# Patient Record
Sex: MALE | Race: WHITE | NOT HISPANIC OR LATINO | Employment: PART TIME | URBAN - NONMETROPOLITAN AREA
[De-identification: names, ages, dates, MRNs, and addresses within clinical notes are randomized per-mention and may not be internally consistent; named-entity substitution may affect disease eponyms.]

---

## 2021-07-16 ENCOUNTER — HOSPITAL ENCOUNTER (OUTPATIENT)
Dept: ULTRASOUND IMAGING | Facility: HOSPITAL | Age: 66
Discharge: HOME | End: 2021-07-16
Payer: COMMERCIAL

## 2021-07-16 DIAGNOSIS — Z13.6 SCREENING FOR ABDOMINAL AORTIC ANEURYSM: ICD-10-CM

## 2021-07-16 PROCEDURE — 76706 US ABDL AORTA SCREEN AAA: CPT

## 2022-09-01 ENCOUNTER — APPOINTMENT (OUTPATIENT)
Dept: RADIOLOGY | Facility: HOSPITAL | Age: 67
End: 2022-09-01
Payer: COMMERCIAL

## 2022-09-01 ENCOUNTER — HOSPITAL ENCOUNTER (EMERGENCY)
Facility: HOSPITAL | Age: 67
Discharge: HOME | End: 2022-09-01
Attending: EMERGENCY MEDICINE | Admitting: EMERGENCY MEDICINE
Payer: COMMERCIAL

## 2022-09-01 VITALS
BODY MASS INDEX: 24.72 KG/M2 | DIASTOLIC BLOOD PRESSURE: 97 MMHG | OXYGEN SATURATION: 100 % | TEMPERATURE: 97.7 F | WEIGHT: 144.8 LBS | HEART RATE: 76 BPM | SYSTOLIC BLOOD PRESSURE: 144 MMHG | RESPIRATION RATE: 21 BRPM | HEIGHT: 64 IN

## 2022-09-01 DIAGNOSIS — M79.641 RIGHT HAND PAIN: Primary | ICD-10-CM

## 2022-09-01 PROCEDURE — 73130 X-RAY EXAM OF HAND: CPT

## 2022-09-01 PROCEDURE — 99284 EMERGENCY DEPT VISIT MOD MDM: CPT | Performed by: EMERGENCY MEDICINE

## 2022-09-01 PROCEDURE — 99283 EMERGENCY DEPT VISIT LOW MDM: CPT

## 2022-09-01 RX ORDER — DOCUSATE SODIUM 100 MG/1
CAPSULE, LIQUID FILLED ORAL
COMMUNITY

## 2022-09-01 RX ORDER — SIMVASTATIN 40 MG
1 TABLET ORAL DAILY
COMMUNITY
Start: 2022-06-05

## 2022-09-01 RX ORDER — PANTOPRAZOLE SODIUM 40 MG/1
TABLET, DELAYED RELEASE ORAL
COMMUNITY

## 2022-09-01 RX ORDER — HYDROCHLOROTHIAZIDE 12.5 MG/1
1 CAPSULE, GELATIN COATED ORAL DAILY
COMMUNITY
Start: 2022-06-05

## 2022-09-01 RX ORDER — FLUTICASONE PROPIONATE 50 MCG
2 SPRAY, SUSPENSION (ML) NASAL DAILY
COMMUNITY
Start: 2022-07-27

## 2022-09-01 RX ORDER — FAMOTIDINE 20 MG/1
20 TABLET, FILM COATED ORAL 2 TIMES DAILY
COMMUNITY
Start: 2022-08-02

## 2022-09-01 RX ORDER — ZIPRASIDONE HYDROCHLORIDE 80 MG/1
1 CAPSULE ORAL
COMMUNITY
Start: 2022-05-09

## 2022-09-01 RX ORDER — CHLORAL HYDRATE 500 MG
CAPSULE ORAL
COMMUNITY

## 2022-09-01 RX ORDER — ASPIRIN 81 MG/1
TABLET ORAL
COMMUNITY

## 2022-09-01 NOTE — ED PROVIDER NOTES
History  Chief Complaint   Patient presents with    Hand Pain     Pt states he came from work, uses weed chele daily and states he has been having right hand for about 2 years and worsening and hand feels tight  Does take ibuprofen with minimal relief  49-year-old male complains of right hand 3rd MCP pain with extension worse with using weed whacker, is his trigger finger  No direct injury  No other complaints  No numbness weakness some swelling  History provided by:  Patient  Hand Pain  Location:  Right 3rd MCP  Quality:  Ache, sharp  Severity:  Severe  Onset quality: Mostly with use  Timing:  Intermittent  Progression:  Partially resolved  Chronicity:  Chronic  Context:  Occurs with use of weed whacker  Relieved by:  Ibuprofen  Worsened by:  Extension, use  Associated symptoms: no chest pain, no fever and no shortness of breath        Prior to Admission Medications   Prescriptions Last Dose Informant Patient Reported? Taking?    Garlic 10 MG CAPS   Yes No   Omega-3 Fatty Acids (fish oil) 1,000 mg   Yes No   Sig: Take by mouth   aspirin (ECOTRIN LOW STRENGTH) 81 mg EC tablet   Yes No   Sig: Take by mouth   docusate sodium (Colace) 100 mg capsule   Yes No   Sig: Take by mouth   famotidine (PEPCID) 20 mg tablet   Yes Yes   Sig: Take 20 mg by mouth 2 (two) times a day   fluticasone (FLONASE) 50 mcg/act nasal spray   Yes Yes   Si sprays by Each Nare route daily   hydrochlorothiazide (MICROZIDE) 12 5 mg capsule   Yes Yes   Sig: Take 1 capsule by mouth daily   pantoprazole (PROTONIX) 40 mg tablet   Yes No   Sig: Take by mouth   simvastatin (ZOCOR) 40 mg tablet   Yes Yes   Sig: Take 1 tablet by mouth daily   ziprasidone (GEODON) 80 mg capsule   Yes Yes   Sig: Take 1 capsule by mouth daily at bedtime      Facility-Administered Medications: None       Past Medical History:   Diagnosis Date    Anxiety     Hyperlipemia     Hypertension     Psychiatric disorder        Past Surgical History:   Procedure Laterality Date    CHOLECYSTECTOMY      HERNIA REPAIR         History reviewed  No pertinent family history  I have reviewed and agree with the history as documented  E-Cigarette/Vaping    E-Cigarette Use Never User      E-Cigarette/Vaping Substances     Social History     Tobacco Use    Smoking status: Never Smoker    Smokeless tobacco: Never Used   Vaping Use    Vaping Use: Never used   Substance Use Topics    Alcohol use: Not Currently    Drug use: Not Currently       Review of Systems   Constitutional: Negative for fever  Respiratory: Negative for shortness of breath  Cardiovascular: Negative for chest pain  All other systems reviewed and are negative  Physical Exam  Physical Exam    Vital Signs  ED Triage Vitals [09/01/22 1418]   Temperature Pulse Respirations Blood Pressure SpO2   97 7 °F (36 5 °C) 76 21 144/97 100 %      Temp Source Heart Rate Source Patient Position - Orthostatic VS BP Location FiO2 (%)   Temporal Monitor Sitting Left arm --      Pain Score       8           Vitals:    09/01/22 1418   BP: 144/97   Pulse: 76   Patient Position - Orthostatic VS: Sitting         Visual Acuity      ED Medications  Medications - No data to display    Diagnostic Studies  Results Reviewed     None                 XR hand 3+ views RIGHT   ED Interpretation by Amada Stubbs DO (09/01 1505)   No obvious fracture or destructive changes       Final Result by Reanna Oates MD (09/02 0231)      No acute osseous abnormality              Workstation performed: [id number]                    Procedures  Procedures         ED Course  ED Course as of 09/02/22 1553   Fri Sep 02, 2022   1552 Procedure note:  Right 3rd finger put in Alumafoam splint, taped and placed and extended over 3rd MCP, well tolerated, provided extra tape     instructed on use care, voices good understanding and agreeable with close outpatient follow-up, will return immediately if worse or any new symptoms SBIRT 20yo+    Flowsheet Row Most Recent Value   SBIRT (23 yo +)    In order to provide better care to our patients, we are screening all of our patients for alcohol and drug use  Would it be okay to ask you these screening questions? Yes Filed at: 09/01/2022 1425   Initial Alcohol Screen: US AUDIT-C     1  How often do you have a drink containing alcohol? 0 Filed at: 09/01/2022 1425   2  How many drinks containing alcohol do you have on a typical day you are drinking? 0 Filed at: 09/01/2022 1425   3a  Male UNDER 65: How often do you have five or more drinks on one occasion? 0 Filed at: 09/01/2022 1425   3b  FEMALE Any Age, or MALE 65+: How often do you have 4 or more drinks on one occassion? 0 Filed at: 09/01/2022 1425   Audit-C Score 0 Filed at: 09/01/2022 1425   SERGIO: How many times in the past year have you    Used an illegal drug or used a prescription medication for non-medical reasons? Never Filed at: 09/01/2022 1425                    MDM    Disposition  Final diagnoses:   Right hand pain - Third metacarpal phalangeal overuse injury     Time reflects when diagnosis was documented in both MDM as applicable and the Disposition within this note     Time User Action Codes Description Comment    9/1/2022  3:05 PM Charis More Add [B47 166] Right hand pain     9/1/2022  3:06 PM Charis More Modify [P43 885] Right hand pain Third metacarpal phalangeal overuse injury      ED Disposition     ED Disposition   Discharge    Condition   Stable    Date/Time   u Sep 1, 2022  3:05 PM    Comment   Apolinar Ramirez discharge to home/self care                 Follow-up Information     Follow up With Specialties Details Why Irina Edmonds MD Family Medicine Schedule an appointment as soon as possible for a visit in 1 week  Kevin Mai [id number]  [phone]            Discharge Medication List as of 9/1/2022  3:06 PM      CONTINUE these medications which have NOT CHANGED    Details   famotidine (PEPCID) 20 mg tablet Take 20 mg by mouth 2 (two) times a day, Starting Tue 8/2/2022, Historical Med      fluticasone (FLONASE) 50 mcg/act nasal spray 2 sprays by Each Nare route daily, Starting Wed 7/27/2022, Historical Med      hydrochlorothiazide (MICROZIDE) 12 5 mg capsule Take 1 capsule by mouth daily, Starting Sun 6/5/2022, Historical Med      simvastatin (ZOCOR) 40 mg tablet Take 1 tablet by mouth daily, Starting Sun 6/5/2022, Historical Med      ziprasidone (GEODON) 80 mg capsule Take 1 capsule by mouth daily at bedtime, Starting Mon 5/9/2022, Historical Med      aspirin (ECOTRIN LOW STRENGTH) 81 mg EC tablet Take by mouth, Historical Med      docusate sodium (Colace) 100 mg capsule Take by mouth, Historical Med      Garlic 10 MG CAPS Historical Med      Omega-3 Fatty Acids (fish oil) 1,000 mg Take by mouth, Historical Med      pantoprazole (PROTONIX) 40 mg tablet Take by mouth, Historical Med             No discharge procedures on file      PDMP Review     None          ED Provider  Electronically Signed by           Marnie Shelton DO  09/02/22 8981 mouth, Historical Med      docusate sodium (Colace) 100 mg capsule Take by mouth, Historical Med      Garlic 10 MG CAPS Historical Med      Omega-3 Fatty Acids (fish oil) 1,000 mg Take by mouth, Historical Med      pantoprazole (PROTONIX) 40 mg tablet Take by mouth, Historical Med             No discharge procedures on file      PDMP Review     None          ED Provider  Electronically Signed by           Elisabeth Gibbs DO  09/02/22 [id number] [place], DO  09/07/22 7285

## 2022-09-01 NOTE — DISCHARGE INSTRUCTIONS
Return immediately if worse or any new symptoms  Tylenol 1000 mg every 6 hours as needed  and/or  Advil 400 mg every 6 hours as needed  May take both together  Ice 20 minutes hourly as needed  Use splint for comfort

## 2022-10-30 ENCOUNTER — APPOINTMENT (EMERGENCY)
Dept: RADIOLOGY | Facility: HOSPITAL | Age: 67
End: 2022-10-30

## 2022-10-30 ENCOUNTER — HOSPITAL ENCOUNTER (EMERGENCY)
Facility: HOSPITAL | Age: 67
Discharge: HOME | End: 2022-10-30
Attending: EMERGENCY MEDICINE

## 2022-10-30 VITALS
BODY MASS INDEX: 25.07 KG/M2 | HEIGHT: 64 IN | RESPIRATION RATE: 18 BRPM | DIASTOLIC BLOOD PRESSURE: 68 MMHG | HEART RATE: 80 BPM | OXYGEN SATURATION: 97 % | SYSTOLIC BLOOD PRESSURE: 123 MMHG | TEMPERATURE: 97.7 F | WEIGHT: 146.83 LBS

## 2022-10-30 DIAGNOSIS — R05.9 COUGH, UNSPECIFIED TYPE: Primary | ICD-10-CM

## 2022-10-30 RX ORDER — ALBUTEROL SULFATE 90 UG/1
2 AEROSOL, METERED RESPIRATORY (INHALATION) ONCE
Status: COMPLETED | OUTPATIENT
Start: 2022-10-30 | End: 2022-10-30

## 2022-10-30 RX ORDER — PREDNISONE 10 MG/1
50 TABLET ORAL DAILY
Qty: 25 TABLET | Refills: 0 | Status: SHIPPED | OUTPATIENT
Start: 2022-10-30 | End: 2022-11-04

## 2022-10-30 RX ORDER — ALBUTEROL SULFATE 90 UG/1
AEROSOL, METERED RESPIRATORY (INHALATION)
Qty: 18 G | Refills: 0 | Status: SHIPPED | OUTPATIENT
Start: 2022-10-30

## 2022-10-30 RX ADMIN — ALBUTEROL SULFATE 2 PUFF: 90 AEROSOL, METERED RESPIRATORY (INHALATION) at 09:50

## 2022-10-30 NOTE — ED PROVIDER NOTES
History  Chief Complaint   Patient presents with   • Cough     Pt c/o worsening cough over past couple weeks  Taking mucinex w/o relief  Denies sob/fevers/n/v/d     14-year-old male complains of chronic cough for about 4 years that is worse over the past few days, no chest pain or discomfort unless coughing  No sputum, no hemoptysis  No fever chills  No other complaints      History provided by:  Patient  Cough  Cough characteristics:  Dry  Severity:  Moderate  Onset quality:  Gradual  Timing:  Constant  Progression:  Worsening  Chronicity:  Chronic  Smoker: no    Relieved by:  Nothing  Worsened by:  Nothing  Ineffective treatments:  None tried  Associated symptoms: no chest pain, no fever and no shortness of breath        Prior to Admission Medications   Prescriptions Last Dose Informant Patient Reported? Taking? Garlic 10 MG CAPS   Yes No   Omega-3 Fatty Acids (fish oil) 1,000 mg   Yes No   Sig: Take by mouth   aspirin (ECOTRIN LOW STRENGTH) 81 mg EC tablet   Yes No   Sig: Take by mouth   docusate sodium (Colace) 100 mg capsule   Yes No   Sig: Take by mouth   famotidine (PEPCID) 20 mg tablet   Yes No   Sig: Take 20 mg by mouth 2 (two) times a day   fluticasone (FLONASE) 50 mcg/act nasal spray   Yes No   Si sprays by Each Nare route daily   hydrochlorothiazide (MICROZIDE) 12 5 mg capsule   Yes No   Sig: Take 1 capsule by mouth daily   pantoprazole (PROTONIX) 40 mg tablet   Yes No   Sig: Take by mouth   simvastatin (ZOCOR) 40 mg tablet   Yes No   Sig: Take 1 tablet by mouth daily   ziprasidone (GEODON) 80 mg capsule   Yes No   Sig: Take 1 capsule by mouth daily at bedtime      Facility-Administered Medications: None       Past Medical History:   Diagnosis Date   • Anxiety    • Hyperlipemia    • Hypertension    • Psychiatric disorder        Past Surgical History:   Procedure Laterality Date   • CHOLECYSTECTOMY     • HERNIA REPAIR         History reviewed  No pertinent family history    I have reviewed and agree with the history as documented  E-Cigarette/Vaping   • E-Cigarette Use Never User      E-Cigarette/Vaping Substances     Social History     Tobacco Use   • Smoking status: Never Smoker   • Smokeless tobacco: Never Used   Vaping Use   • Vaping Use: Never used   Substance Use Topics   • Alcohol use: Not Currently   • Drug use: Not Currently       Review of Systems   Constitutional: Negative for fever  Respiratory: Positive for cough  Negative for shortness of breath  Cardiovascular: Negative for chest pain  All other systems reviewed and are negative  Physical Exam  Physical Exam  Vitals and nursing note reviewed  Constitutional:       Comments: Pleasant, comfortable-appearing   HENT:      Head: Normocephalic and atraumatic  Mouth/Throat:      Mouth: Mucous membranes are moist       Pharynx: Oropharynx is clear  Comments: Edentulous  Eyes:      Conjunctiva/sclera: Conjunctivae normal       Pupils: Pupils are equal, round, and reactive to light  Cardiovascular:      Rate and Rhythm: Normal rate and regular rhythm  Heart sounds: Normal heart sounds  Pulmonary:      Effort: Pulmonary effort is normal       Breath sounds: Normal breath sounds  Abdominal:      General: Bowel sounds are normal  There is no distension  Palpations: Abdomen is soft  Tenderness: There is no abdominal tenderness  Musculoskeletal:         General: No deformity  Cervical back: Neck supple  Skin:     General: Skin is warm and dry  Neurological:      General: No focal deficit present  Mental Status: He is alert and oriented to person, place, and time  Cranial Nerves: No cranial nerve deficit  Coordination: Coordination normal    Psychiatric:         Behavior: Behavior normal          Thought Content:  Thought content normal          Judgment: Judgment normal          Vital Signs  ED Triage Vitals [10/30/22 0927]   Temperature Pulse Respirations Blood Pressure SpO2   97 7 °F (36 5 °C) 87 19 146/76 98 %      Temp Source Heart Rate Source Patient Position - Orthostatic VS BP Location FiO2 (%)   Temporal Monitor Lying Right arm --      Pain Score       No Pain           Vitals:    10/30/22 0927 10/30/22 0930 10/30/22 1000 10/30/22 1030   BP: 146/76 128/62 126/91 123/68   Pulse: 87 90 90 80   Patient Position - Orthostatic VS: Lying            Visual Acuity      ED Medications  Medications   albuterol (PROVENTIL HFA,VENTOLIN HFA) inhaler 2 puff (2 puffs Inhalation Given 10/30/22 0950)       Diagnostic Studies  Results Reviewed     None                 XR chest 2 views   ED Interpretation by Mike Molina DO (10/30 1045)   No obvious infiltrate                 Procedures  Procedures         ED Course  ED Course as of 10/30/22 1053   Sun Oct 30, 2022   1049 Notes used albuterol inhaler, possibly improved  We discussed chest x-ray, care going forward and follow-up, he is agreeable and will return immediately if worse or new symptoms                               SBIRT 20yo+    Flowsheet Row Most Recent Value   SBIRT (23 yo +)    In order to provide better care to our patients, we are screening all of our patients for alcohol and drug use  Would it be okay to ask you these screening questions? No Filed at: 10/30/2022 3498                    MDM    Disposition  Final diagnoses:   Cough, unspecified type     Time reflects when diagnosis was documented in both MDM as applicable and the Disposition within this note     Time User Action Codes Description Comment    10/30/2022 10:46 AM Angelita Gomes Add [R05 9] Cough, unspecified type       ED Disposition     ED Disposition   Discharge    Condition   Stable    Date/Time   Sun Oct 30, 2022 10:45 AM    Comment   Lily Massing discharge to home/self care                 Follow-up Information     Follow up With Specialties Details Why Kleber Phillips MD Family Medicine Schedule an appointment as soon as possible for a visit in 1 week  [address]  [phone]            Patient's Medications   Discharge Prescriptions    ALBUTEROL (PROVENTIL HFA,VENTOLIN HFA) 90 MCG/ACT INHALER    2-3 inhalations every 3-4 hours for 3 days and then as needed for cough, wheezing       Start Date: 10/30/2022End Date: --       Order Dose: --       Quantity: 18 g    Refills: 0    PREDNISONE 10 MG TABLET    Take 5 tablets (50 mg total) by mouth daily for 5 days       Start Date: 10/30/2022End Date: 11/4/2022       Order Dose: 50 mg       Quantity: 25 tablet    Refills: 0       No discharge procedures on file      PDMP Review     None          ED Provider  Electronically Signed by           Mike Molina DO  10/31/22 3665

## 2022-10-30 NOTE — DISCHARGE INSTRUCTIONS
Exacerbation of chronic cough  Return immediately if worse or any new symptoms  Albuterol 2-3 inhalations every 3-4 hours for the next 3 days and then as needed, use AeroChamber/spacer

## 2023-02-04 ENCOUNTER — HOSPITAL ENCOUNTER (EMERGENCY)
Facility: HOSPITAL | Age: 68
Discharge: HOME | End: 2023-02-04
Attending: EMERGENCY MEDICINE

## 2023-02-04 ENCOUNTER — APPOINTMENT (EMERGENCY)
Dept: CT IMAGING | Facility: HOSPITAL | Age: 68
End: 2023-02-04

## 2023-02-04 VITALS
SYSTOLIC BLOOD PRESSURE: 145 MMHG | WEIGHT: 150.79 LBS | OXYGEN SATURATION: 98 % | BODY MASS INDEX: 25.74 KG/M2 | RESPIRATION RATE: 20 BRPM | TEMPERATURE: 97.4 F | DIASTOLIC BLOOD PRESSURE: 76 MMHG | HEIGHT: 64 IN | HEART RATE: 86 BPM

## 2023-02-04 DIAGNOSIS — R10.9 ABDOMINAL PAIN: Primary | ICD-10-CM

## 2023-02-04 DIAGNOSIS — K21.9 GERD (GASTROESOPHAGEAL REFLUX DISEASE): ICD-10-CM

## 2023-02-04 DIAGNOSIS — K59.00 CONSTIPATION: ICD-10-CM

## 2023-02-04 LAB
ALBUMIN SERPL BCP-MCNC: 4.5 G/DL (ref 3.5–5)
ALP SERPL-CCNC: 54 U/L (ref 34–104)
ALT SERPL W P-5'-P-CCNC: 21 U/L (ref 7–52)
ANION GAP SERPL CALCULATED.3IONS-SCNC: 9 MMOL/L (ref 4–13)
AST SERPL W P-5'-P-CCNC: 26 U/L (ref 13–39)
BASOPHILS # BLD AUTO: 0.05 THOUSANDS/ÂΜL (ref 0–0.1)
BASOPHILS NFR BLD AUTO: 1 % (ref 0–1)
BILIRUB SERPL-MCNC: 0.28 MG/DL (ref 0.2–1)
BUN SERPL-MCNC: 20 MG/DL (ref 5–25)
CALCIUM SERPL-MCNC: 9 MG/DL (ref 8.4–10.2)
CHLORIDE SERPL-SCNC: 100 MMOL/L (ref 96–108)
CO2 SERPL-SCNC: 29 MMOL/L (ref 21–32)
CREAT SERPL-MCNC: 0.96 MG/DL (ref 0.6–1.3)
EOSINOPHIL # BLD AUTO: 0.14 THOUSAND/ÂΜL (ref 0–0.61)
EOSINOPHIL NFR BLD AUTO: 2 % (ref 0–6)
ERYTHROCYTE [DISTWIDTH] IN BLOOD BY AUTOMATED COUNT: 12.1 % (ref 11.6–15.1)
GFR SERPL CREATININE-BSD FRML MDRD: 81 ML/MIN/1.73SQ M
GLUCOSE SERPL-MCNC: 93 MG/DL (ref 65–140)
HCT VFR BLD AUTO: 44 % (ref 36.5–49.3)
HGB BLD-MCNC: 15 G/DL (ref 12–17)
IMM GRANULOCYTES # BLD AUTO: 0.01 THOUSAND/UL (ref 0–0.2)
IMM GRANULOCYTES NFR BLD AUTO: 0 % (ref 0–2)
LACTATE SERPL-SCNC: 2.1 MMOL/L (ref 0.5–2)
LIPASE SERPL-CCNC: <6 U/L (ref 11–82)
LYMPHOCYTES # BLD AUTO: 1.41 THOUSANDS/ÂΜL (ref 0.6–4.47)
LYMPHOCYTES NFR BLD AUTO: 23 % (ref 14–44)
MCH RBC QN AUTO: 29.5 PG (ref 26.8–34.3)
MCHC RBC AUTO-ENTMCNC: 34.1 G/DL (ref 31.4–37.4)
MCV RBC AUTO: 86 FL (ref 82–98)
MONOCYTES # BLD AUTO: 0.56 THOUSAND/ÂΜL (ref 0.17–1.22)
MONOCYTES NFR BLD AUTO: 9 % (ref 4–12)
NEUTROPHILS # BLD AUTO: 3.93 THOUSANDS/ÂΜL (ref 1.85–7.62)
NEUTS SEG NFR BLD AUTO: 65 % (ref 43–75)
NRBC BLD AUTO-RTO: 0 /100 WBCS
PLATELET # BLD AUTO: 240 THOUSANDS/UL (ref 149–390)
PMV BLD AUTO: 8.9 FL (ref 8.9–12.7)
POTASSIUM SERPL-SCNC: 3.2 MMOL/L (ref 3.5–5.3)
PROT SERPL-MCNC: 7.5 G/DL (ref 6.4–8.4)
RBC # BLD AUTO: 5.09 MILLION/UL (ref 3.88–5.62)
SODIUM SERPL-SCNC: 138 MMOL/L (ref 135–147)
WBC # BLD AUTO: 6.1 THOUSAND/UL (ref 4.31–10.16)

## 2023-02-04 RX ORDER — TOPIRAMATE 50 MG/1
50 TABLET, FILM COATED ORAL 2 TIMES DAILY
COMMUNITY

## 2023-02-04 RX ORDER — POTASSIUM CHLORIDE 20 MEQ/1
20 TABLET, EXTENDED RELEASE ORAL ONCE
Status: COMPLETED | OUTPATIENT
Start: 2023-02-04 | End: 2023-02-04

## 2023-02-04 RX ORDER — ONDANSETRON 2 MG/ML
4 INJECTION INTRAMUSCULAR; INTRAVENOUS ONCE
Status: COMPLETED | OUTPATIENT
Start: 2023-02-04 | End: 2023-02-04

## 2023-02-04 RX ORDER — BENZONATATE 100 MG/1
100 CAPSULE ORAL 3 TIMES DAILY PRN
COMMUNITY

## 2023-02-04 RX ADMIN — IOHEXOL 100 ML: 350 INJECTION, SOLUTION INTRAVENOUS at 17:20

## 2023-02-04 RX ADMIN — ONDANSETRON 4 MG: 2 INJECTION INTRAMUSCULAR; INTRAVENOUS at 16:20

## 2023-02-04 RX ADMIN — POTASSIUM CHLORIDE 20 MEQ: 20 TABLET, EXTENDED RELEASE ORAL at 17:20

## 2023-02-04 NOTE — ED PROVIDER NOTES
History  Chief Complaint   Patient presents with   • Nausea     Patient states "the asbestos in the [place] is killing me"  Reports abdominal pain, nausea, constipation, reflux  27-year-old male presenting to the emergency department for evaluation of abdominal pain, nausea  Patient states he has chronic constipation and GERD  Currently taking Protonix and eating a high-fiber diet  Patient is concerned for the asbestos in his apartment  He denies any chest pain, palpitations, shortness of breath  He denies any vomiting  Denies diarrhea  Denies any urinary symptoms  History provided by:  Patient  Abdominal Pain  Pain location:  Generalized  Pain quality comment:  Unable to specify  Pain radiates to:  Does not radiate  Pain severity:  Mild  Onset quality:  Gradual  Progression:  Unchanged  Chronicity:  New  Relieved by:  None tried  Worsened by:  Nothing  Ineffective treatments:  None tried  Associated symptoms: constipation and nausea    Associated symptoms: no anorexia, no belching, no chest pain, no chills, no cough, no diarrhea, no dysuria, no fatigue, no fever, no flatus, no hematemesis, no hematochezia, no hematuria, no melena, no shortness of breath, no sore throat and no vomiting        Prior to Admission Medications   Prescriptions Last Dose Informant Patient Reported? Taking?    Garlic 10 MG CAPS 8851  Yes Yes   Omega-3 Fatty Acids (fish oil) 1,000 mg 2023  Yes Yes   Sig: Take by mouth   albuterol (PROVENTIL HFA,VENTOLIN HFA) 90 mcg/act inhaler   No Yes   Si-3 inhalations every 3-4 hours for 3 days and then as needed for cough, wheezing   aspirin (ECOTRIN LOW STRENGTH) 81 mg EC tablet 2023  Yes Yes   Sig: Take by mouth   benzonatate (TESSALON PERLES) 100 mg capsule   Yes Yes   Sig: Take 100 mg by mouth 3 (three) times a day as needed for cough   docusate sodium (COLACE) 100 mg capsule 2023  Yes Yes   Sig: Take by mouth   famotidine (PEPCID) 20 mg tablet 2023  Yes Yes Sig: Take 20 mg by mouth 2 (two) times a day   fluticasone (FLONASE) 50 mcg/act nasal spray 2023  Yes Yes   Si sprays by Each Nare route daily   hydrochlorothiazide (MICROZIDE) 12 5 mg capsule 2023  Yes Yes   Sig: Take 1 capsule by mouth daily   pantoprazole (PROTONIX) 40 mg tablet 2023  Yes Yes   Sig: Take by mouth   simvastatin (ZOCOR) 40 mg tablet 2023  Yes Yes   Sig: Take 1 tablet by mouth daily   topiramate (TOPAMAX) 50 MG tablet 2023  Yes Yes   Sig: Take 50 mg by mouth 2 (two) times a day   ziprasidone (GEODON) 80 mg capsule 2/3/2023  Yes Yes   Sig: Take 1 capsule by mouth daily at bedtime      Facility-Administered Medications: None       Past Medical History:   Diagnosis Date   • Anxiety    • Hyperlipemia    • Hypertension    • Psychiatric disorder        Past Surgical History:   Procedure Laterality Date   • CHOLECYSTECTOMY     • HERNIA REPAIR         No family history on file  I have reviewed and agree with the history as documented  E-Cigarette/Vaping   • E-Cigarette Use Never User      E-Cigarette/Vaping Substances     Social History     Tobacco Use   • Smoking status: Never   • Smokeless tobacco: Never   Vaping Use   • Vaping Use: Never used   Substance Use Topics   • Alcohol use: Not Currently   • Drug use: Not Currently       Review of Systems   Constitutional: Negative for appetite change, chills, diaphoresis, fatigue and fever  HENT: Negative for sore throat  Respiratory: Negative  Negative for cough and shortness of breath  Cardiovascular: Negative  Negative for chest pain  Gastrointestinal: Positive for abdominal pain, constipation and nausea  Negative for anorexia, diarrhea, flatus, hematemesis, hematochezia, melena and vomiting  Genitourinary: Negative  Negative for dysuria and hematuria  Musculoskeletal: Negative  Neurological: Negative  All other systems reviewed and are negative        Physical Exam  Physical Exam  Vitals and nursing note reviewed  Constitutional:       General: He is not in acute distress  Appearance: Normal appearance  He is not ill-appearing, toxic-appearing or diaphoretic  HENT:      Head: Normocephalic  Nose: Nose normal    Eyes:      Conjunctiva/sclera: Conjunctivae normal    Cardiovascular:      Rate and Rhythm: Normal rate and regular rhythm  Pulmonary:      Effort: Pulmonary effort is normal  No respiratory distress  Breath sounds: Normal breath sounds  No stridor  No wheezing, rhonchi or rales  Chest:      Chest wall: No tenderness  Abdominal:      General: Abdomen is flat  Bowel sounds are normal  There is no distension  Palpations: Abdomen is soft  Tenderness: There is abdominal tenderness (Generalized)  There is no right CVA tenderness, left CVA tenderness or guarding  Musculoskeletal:         General: Normal range of motion  Cervical back: Normal range of motion  Skin:     General: Skin is warm and dry  Capillary Refill: Capillary refill takes less than 2 seconds  Findings: No bruising, erythema or rash  Neurological:      General: No focal deficit present  Mental Status: He is alert and oriented to person, place, and time           Vital Signs  ED Triage Vitals [02/04/23 1608]   Temperature Pulse Respirations Blood Pressure SpO2   (!) 97 4 °F (36 3 °C) 95 20 145/76 97 %      Temp Source Heart Rate Source Patient Position - Orthostatic VS BP Location FiO2 (%)   Temporal Monitor Lying Right arm --      Pain Score       No Pain           Vitals:    02/04/23 1730 02/04/23 1745 02/04/23 1800 02/04/23 1815   BP:       Pulse: 89 89 89 86   Patient Position - Orthostatic VS:             Visual Acuity      ED Medications  Medications   ondansetron (ZOFRAN) injection 4 mg (4 mg Intravenous Given 2/4/23 1620)   potassium chloride (K-DUR,KLOR-CON) CR tablet 20 mEq (20 mEq Oral Given 2/4/23 1720)   iohexol (OMNIPAQUE) 350 MG/ML injection (SINGLE-DOSE) 100 mL (100 mL Intravenous Given 2/4/23 1720)       Diagnostic Studies  Results Reviewed     Procedure Component Value Units Date/Time    Lactic acid [[id number]]  (Abnormal) Collected: 02/04/23 1615    Lab Status: Final result Specimen: Blood from Arm, Left Updated: 02/04/23 1653     LACTIC ACID 2 1 mmol/L     Narrative:      Result may be elevated if tourniquet was used during collection      Lactic acid 2 Hours [[id number]]     Lab Status: No result Specimen: Blood     Comprehensive metabolic panel [[id number]]  (Abnormal) Collected: 02/04/23 1615    Lab Status: Final result Specimen: Blood from Arm, Left Updated: 02/04/23 1637     Sodium 138 mmol/L      Potassium 3 2 mmol/L      Chloride 100 mmol/L      CO2 29 mmol/L      ANION GAP 9 mmol/L      BUN 20 mg/dL      Creatinine 0 96 mg/dL      Glucose 93 mg/dL      Calcium 9 0 mg/dL      AST 26 U/L      ALT 21 U/L      Alkaline Phosphatase 54 U/L      Total Protein 7 5 g/dL      Albumin 4 5 g/dL      Total Bilirubin 0 28 mg/dL      eGFR 81 ml/min/1 73sq m     Narrative:      [place] guidelines for Chronic Kidney Disease (CKD):   •  Stage 1 with normal or high GFR (GFR > 90 mL/min/1 73 square meters)  •  Stage 2 Mild CKD (GFR = 60-89 mL/min/1 73 square meters)  •  Stage 3A Moderate CKD (GFR = 45-59 mL/min/1 73 square meters)  •  Stage 3B Moderate CKD (GFR = 30-44 mL/min/1 73 square meters)  •  Stage 4 Severe CKD (GFR = 15-29 mL/min/1 73 square meters)  •  Stage 5 End Stage CKD (GFR <15 mL/min/1 73 square meters)  Note: GFR calculation is accurate only with a steady state creatinine    Lipase [[id number]]  (Abnormal) Collected: 02/04/23 1615    Lab Status: Final result Specimen: Blood from Arm, Left Updated: 02/04/23 1637     Lipase <6 u/L     CBC and differential [[id number]] Collected: 02/04/23 1615    Lab Status: Final result Specimen: Blood from Arm, Left Updated: 02/04/23 1620     WBC 6 10 Thousand/uL      RBC 5 09 Million/uL      Hemoglobin 15 0 g/dL Hematocrit 44 0 %      MCV 86 fL      MCH 29 5 pg      MCHC 34 1 g/dL      RDW 12 1 %      MPV 8 9 fL      Platelets 980 Thousands/uL      nRBC 0 /100 WBCs      Neutrophils Relative 65 %      Immat GRANS % 0 %      Lymphocytes Relative 23 %      Monocytes Relative 9 %      Eosinophils Relative 2 %      Basophils Relative 1 %      Neutrophils Absolute 3 93 Thousands/µL      Immature Grans Absolute 0 01 Thousand/uL      Lymphocytes Absolute 1 41 Thousands/µL      Monocytes Absolute 0 56 Thousand/µL      Eosinophils Absolute 0 14 Thousand/µL      Basophils Absolute 0 05 Thousands/µL                  CT abdomen pelvis with contrast   Final Result by Clari Yoon MD (02/04 1814)      1  No acute abnormality in the abdomen or pelvis  2   Moderate amount of stool throughout the colon, suggesting constipation  3   Small amount of fluid in the distal esophagus, suggesting gastroesophageal reflux  Workstation performed: [id number]                    Procedures  Procedures         ED Course  ED Course as of 02/04/23 1842   Sat Feb 04, 2023   1821 CT: 1  No acute abnormality in the abdomen or pelvis      2  Moderate amount of stool throughout the colon, suggesting constipation      3   Small amount of fluid in the distal esophagus, suggesting gastroesophageal reflux  1825 Laboratory findings relatively unremarkable  Very mild  Actiq acid  No leukocytosis  Potassium was mildly low and repleted orally  Medical Decision Making  71-year-old male presents to the emergency department for evaluation of generalized abdominal pain and nausea  Vitals and medical record reviewed  Differential diagnosis included but not limited to gastroenteritis, constipation, pancreatitis, cholecystitis  Patient with no leukocytosis  Lactic acid minimally elevated  Patient is tolerating p o  intake after Zofran  Had some generalized abdominal discomfort on exam   Lipase negative    No evidence of pancreatitis on CT scan  Normal LFTs  No evidence of cholecystitis on CT scan  No evidence of enteritis on CAT scan  There was noted constipation as well as findings consistent with GERD  I discussed these results and findings with the patient  Recommend he continue taking his current prescription medications and follow-up with his PCP  Additionally discussed a high-fiber diet including use of over-the-counter stool softeners and he verbalized understanding  Patient was clinically and hemodynamically stable for discharge    Abdominal pain: acute illness or injury  Constipation: acute illness or injury  GERD (gastroesophageal reflux disease): acute illness or injury  Amount and/or Complexity of Data Reviewed  Labs: ordered  Radiology: ordered  Risk  Prescription drug management  Disposition  Final diagnoses:   Abdominal pain   Constipation   GERD (gastroesophageal reflux disease)     Time reflects when diagnosis was documented in both MDM as applicable and the Disposition within this note     Time User Action Codes Description Comment    2/4/2023  6:23 PM Ciara Quiver Add [R10 9] Abdominal pain     2/4/2023  6:23 PM Ciara Quiver Add [K59 00] Constipation     2/4/2023  6:23 PM Ciara Quiver Add [K21 9] GERD (gastroesophageal reflux disease)       ED Disposition     ED Disposition   Discharge    Condition   Stable    Date/Time   Sat Feb 4, 2023  6:22 PM    Comment   Reji Willis discharge to home/self care                 Follow-up Information     Follow up With Specialties Details Why Contact Info    Ellen Mullins MD Family Medicine In 3 days  [address] [place] [address]  [phone]            Discharge Medication List as of 2/4/2023  6:24 PM      CONTINUE these medications which have NOT CHANGED    Details   albuterol (PROVENTIL HFA,VENTOLIN HFA) 90 mcg/act inhaler 2-3 inhalations every 3-4 hours for 3 days and then as needed for cough, wheezing, Normal      aspirin (ECOTRIN LOW STRENGTH) 81 mg EC tablet Take by mouth, Historical Med      benzonatate (TESSALON PERLES) 100 mg capsule Take 100 mg by mouth 3 (three) times a day as needed for cough, Historical Med      docusate sodium (COLACE) 100 mg capsule Take by mouth, Historical Med      famotidine (PEPCID) 20 mg tablet Take 20 mg by mouth 2 (two) times a day, Starting Tue 8/2/2022, Historical Med      fluticasone (FLONASE) 50 mcg/act nasal spray 2 sprays by Each Nare route daily, Starting Wed 7/27/2022, Historical Med      Garlic 10 MG CAPS Historical Med      hydrochlorothiazide (MICROZIDE) 12 5 mg capsule Take 1 capsule by mouth daily, Starting Sun 6/5/2022, Historical Med      Omega-3 Fatty Acids (fish oil) 1,000 mg Take by mouth, Historical Med      pantoprazole (PROTONIX) 40 mg tablet Take by mouth, Historical Med      simvastatin (ZOCOR) 40 mg tablet Take 1 tablet by mouth daily, Starting Sun 6/5/2022, Historical Med      topiramate (TOPAMAX) 50 MG tablet Take 50 mg by mouth 2 (two) times a day, Historical Med      ziprasidone (GEODON) 80 mg capsule Take 1 capsule by mouth daily at bedtime, Starting Mon 5/9/2022, Historical Med             No discharge procedures on file      PDMP Review     None          ED Provider  Electronically Signed by           Domingo Clement PA-C  02/04/23 5609

## 2023-02-04 NOTE — DISCHARGE INSTRUCTIONS
CT abdomen pelvis with contrast   Final Result      1  No acute abnormality in the abdomen or pelvis  2   Moderate amount of stool throughout the colon, suggesting constipation  3   Small amount of fluid in the distal esophagus, suggesting gastroesophageal reflux  Workstation performed: [id number]           Please follow-up with your family doctor  Take your prescribed medications for your acid reflux  Continue with bowel regimen as we discussed with high-fiber foods  Make sure you are getting plenty of water    Return with new or worsening symptoms

## 2023-02-21 ENCOUNTER — APPOINTMENT (EMERGENCY)
Dept: RADIOLOGY | Facility: HOSPITAL | Age: 68
End: 2023-02-21

## 2023-02-21 ENCOUNTER — HOSPITAL ENCOUNTER (EMERGENCY)
Facility: HOSPITAL | Age: 68
Discharge: HOME | End: 2023-02-21
Attending: EMERGENCY MEDICINE

## 2023-02-21 VITALS
BODY MASS INDEX: 27.78 KG/M2 | OXYGEN SATURATION: 96 % | TEMPERATURE: 98.2 F | RESPIRATION RATE: 18 BRPM | HEART RATE: 88 BPM | DIASTOLIC BLOOD PRESSURE: 76 MMHG | WEIGHT: 161.82 LBS | SYSTOLIC BLOOD PRESSURE: 133 MMHG

## 2023-02-21 DIAGNOSIS — R07.9 CHEST PAIN, UNSPECIFIED TYPE: Primary | ICD-10-CM

## 2023-02-21 LAB
2HR DELTA HS TROPONIN: -1 NG/L
ALBUMIN SERPL BCP-MCNC: 4.2 G/DL (ref 3.5–5)
ALP SERPL-CCNC: 51 U/L (ref 34–104)
ALT SERPL W P-5'-P-CCNC: 24 U/L (ref 7–52)
ANION GAP SERPL CALCULATED.3IONS-SCNC: 6 MMOL/L (ref 4–13)
AST SERPL W P-5'-P-CCNC: 23 U/L (ref 13–39)
BASOPHILS # BLD AUTO: 0.06 THOUSANDS/ÂΜL (ref 0–0.1)
BASOPHILS NFR BLD AUTO: 1 % (ref 0–1)
BILIRUB SERPL-MCNC: 0.24 MG/DL (ref 0.2–1)
BUN SERPL-MCNC: 21 MG/DL (ref 5–25)
CALCIUM SERPL-MCNC: 8.7 MG/DL (ref 8.4–10.2)
CARDIAC TROPONIN I PNL SERPL HS: 2 NG/L
CARDIAC TROPONIN I PNL SERPL HS: 3 NG/L
CHLORIDE SERPL-SCNC: 100 MMOL/L (ref 96–108)
CO2 SERPL-SCNC: 32 MMOL/L (ref 21–32)
CREAT SERPL-MCNC: 0.94 MG/DL (ref 0.6–1.3)
EOSINOPHIL # BLD AUTO: 0.23 THOUSAND/ÂΜL (ref 0–0.61)
EOSINOPHIL NFR BLD AUTO: 4 % (ref 0–6)
ERYTHROCYTE [DISTWIDTH] IN BLOOD BY AUTOMATED COUNT: 12.5 % (ref 11.6–15.1)
GFR SERPL CREATININE-BSD FRML MDRD: 83 ML/MIN/1.73SQ M
GLUCOSE SERPL-MCNC: 110 MG/DL (ref 65–140)
HCT VFR BLD AUTO: 42.3 % (ref 36.5–49.3)
HGB BLD-MCNC: 13.7 G/DL (ref 12–17)
IMM GRANULOCYTES # BLD AUTO: 0.02 THOUSAND/UL (ref 0–0.2)
IMM GRANULOCYTES NFR BLD AUTO: 0 % (ref 0–2)
LYMPHOCYTES # BLD AUTO: 1.88 THOUSANDS/ÂΜL (ref 0.6–4.47)
LYMPHOCYTES NFR BLD AUTO: 31 % (ref 14–44)
MCH RBC QN AUTO: 28.7 PG (ref 26.8–34.3)
MCHC RBC AUTO-ENTMCNC: 32.4 G/DL (ref 31.4–37.4)
MCV RBC AUTO: 89 FL (ref 82–98)
MONOCYTES # BLD AUTO: 0.57 THOUSAND/ÂΜL (ref 0.17–1.22)
MONOCYTES NFR BLD AUTO: 9 % (ref 4–12)
NEUTROPHILS # BLD AUTO: 3.37 THOUSANDS/ÂΜL (ref 1.85–7.62)
NEUTS SEG NFR BLD AUTO: 55 % (ref 43–75)
NRBC BLD AUTO-RTO: 0 /100 WBCS
PLATELET # BLD AUTO: 212 THOUSANDS/UL (ref 149–390)
PMV BLD AUTO: 8.7 FL (ref 8.9–12.7)
POTASSIUM SERPL-SCNC: 3.3 MMOL/L (ref 3.5–5.3)
PROT SERPL-MCNC: 7.1 G/DL (ref 6.4–8.4)
RBC # BLD AUTO: 4.77 MILLION/UL (ref 3.88–5.62)
SODIUM SERPL-SCNC: 138 MMOL/L (ref 135–147)
WBC # BLD AUTO: 6.13 THOUSAND/UL (ref 4.31–10.16)

## 2023-02-21 NOTE — ED PROVIDER NOTES
History  Chief Complaint   Patient presents with   • Chest Pain     Patient c/o chest pain for about 20 minutes ago  History provided by:  Medical records and patient  Chest Pain  Pain location:  Substernal area  Pain quality: aching and dull    Pain quality comment:  "Feels like gas pocket"  Pain radiates to:  Does not radiate  Pain radiates to the back: no    Pain severity:  Moderate  Onset quality:  Sudden  Duration:  30 minutes  Timing:  Constant  Progression:  Unchanged  Chronicity:  Recurrent  Context comment:  Patient with a history of acid reflux, states he developed some central chest discomfort, nonradiating 30 minutes prior to arrival   Relieved by:  Nothing  Worsened by:  Nothing tried  Ineffective treatments:  None tried  Associated symptoms: no abdominal pain, no back pain, no cough, no dizziness, no dysphagia, no fatigue, no fever, no headache, no nausea, no palpitations, no shortness of breath, not vomiting and no weakness        Prior to Admission Medications   Prescriptions Last Dose Informant Patient Reported? Taking?    Garlic 10 MG CAPS   Yes No   Omega-3 Fatty Acids (fish oil) 1,000 mg   Yes No   Sig: Take by mouth   albuterol (PROVENTIL HFA,VENTOLIN HFA) 90 mcg/act inhaler   No No   Si-3 inhalations every 3-4 hours for 3 days and then as needed for cough, wheezing   aspirin (ECOTRIN LOW STRENGTH) 81 mg EC tablet   Yes No   Sig: Take by mouth   benzonatate (TESSALON PERLES) 100 mg capsule   Yes No   Sig: Take 100 mg by mouth 3 (three) times a day as needed for cough   docusate sodium (COLACE) 100 mg capsule   Yes No   Sig: Take by mouth   famotidine (PEPCID) 20 mg tablet   Yes No   Sig: Take 20 mg by mouth 2 (two) times a day   fluticasone (FLONASE) 50 mcg/act nasal spray   Yes No   Si sprays by Each Nare route daily   hydrochlorothiazide (MICROZIDE) 12 5 mg capsule   Yes No   Sig: Take 1 capsule by mouth daily   pantoprazole (PROTONIX) 40 mg tablet   Yes No   Sig: Take by mouth simvastatin (ZOCOR) 40 mg tablet   Yes No   Sig: Take 1 tablet by mouth daily   topiramate (TOPAMAX) 50 MG tablet   Yes No   Sig: Take 50 mg by mouth 2 (two) times a day   ziprasidone (GEODON) 80 mg capsule   Yes No   Sig: Take 1 capsule by mouth daily at bedtime      Facility-Administered Medications: None       Past Medical History:   Diagnosis Date   • Anxiety    • Hyperlipemia    • Hypertension    • Psychiatric disorder        Past Surgical History:   Procedure Laterality Date   • CHOLECYSTECTOMY     • HERNIA REPAIR         History reviewed  No pertinent family history  I have reviewed and agree with the history as documented  E-Cigarette/Vaping   • E-Cigarette Use Never User      E-Cigarette/Vaping Substances     Social History     Tobacco Use   • Smoking status: Never   • Smokeless tobacco: Never   Vaping Use   • Vaping Use: Never used   Substance Use Topics   • Alcohol use: Not Currently   • Drug use: Not Currently       Review of Systems   Constitutional: Negative for appetite change, chills, fatigue and fever  HENT: Negative for ear pain, rhinorrhea, sore throat and trouble swallowing  Eyes: Negative for pain, discharge and visual disturbance  Respiratory: Negative for cough, chest tightness and shortness of breath  Cardiovascular: Positive for chest pain  Negative for palpitations  Gastrointestinal: Negative for abdominal pain, nausea and vomiting  Endocrine: Negative for polydipsia, polyphagia and polyuria  Genitourinary: Negative for difficulty urinating, dysuria, hematuria and testicular pain  Musculoskeletal: Negative for arthralgias and back pain  Skin: Negative for color change and rash  Allergic/Immunologic: Negative for immunocompromised state  Neurological: Negative for dizziness, seizures, syncope, weakness and headaches  Hematological: Negative for adenopathy  Psychiatric/Behavioral: Negative for confusion and dysphoric mood     All other systems reviewed and are negative  Physical Exam  Physical Exam  Vitals and nursing note reviewed  Constitutional:       General: He is not in acute distress  Appearance: Normal appearance  He is not ill-appearing, toxic-appearing or diaphoretic  Comments: Watching TV   HENT:      Head: Normocephalic and atraumatic  Nose: Nose normal  No congestion or rhinorrhea  Mouth/Throat:      Mouth: Mucous membranes are moist       Pharynx: Oropharynx is clear  No oropharyngeal exudate or posterior oropharyngeal erythema  Eyes:      General:         Right eye: No discharge  Left eye: No discharge  Cardiovascular:      Rate and Rhythm: Normal rate and regular rhythm  Pulses: Normal pulses  Heart sounds: Normal heart sounds  No murmur heard  No gallop  Pulmonary:      Effort: Pulmonary effort is normal  No respiratory distress  Breath sounds: Normal breath sounds  No stridor  No wheezing, rhonchi or rales  Chest:      Chest wall: No tenderness  Abdominal:      General: Bowel sounds are normal  There is no distension  Palpations: Abdomen is soft  There is no mass  Tenderness: There is no abdominal tenderness  There is no right CVA tenderness, left CVA tenderness, guarding or rebound  Hernia: No hernia is present  Musculoskeletal:         General: Normal range of motion  Cervical back: Normal range of motion and neck supple  Skin:     General: Skin is warm and dry  Capillary Refill: Capillary refill takes less than 2 seconds  Neurological:      General: No focal deficit present  Mental Status: He is alert and oriented to person, place, and time  Cranial Nerves: No cranial nerve deficit  Sensory: No sensory deficit  Motor: No weakness        Coordination: Coordination normal       Gait: Gait normal       Deep Tendon Reflexes: Reflexes normal    Psychiatric:         Mood and Affect: Mood normal          Behavior: Behavior normal          Thought Content:  Thought content normal          Judgment: Judgment normal          Vital Signs  ED Triage Vitals [02/21/23 1738]   Temperature Pulse Respirations Blood Pressure SpO2   98 2 °F (36 8 °C) 98 18 155/80 98 %      Temp Source Heart Rate Source Patient Position - Orthostatic VS BP Location FiO2 (%)   Temporal Monitor Lying Left arm --      Pain Score       --           Vitals:    02/21/23 1738 02/21/23 1830   BP: 155/80 133/76   Pulse: 98 88   Patient Position - Orthostatic VS: Lying          Visual Acuity      ED Medications  Medications - No data to display    Diagnostic Studies  Results Reviewed     Procedure Component Value Units Date/Time    HS Troponin I 2hr [[id number]]  (Normal) Collected: 02/21/23 2009    Lab Status: Final result Specimen: Blood from Arm, Left Updated: 02/21/23 2035     hs TnI 2hr 2 ng/L      Delta 2hr hsTnI -1 ng/L     HS Troponin I 4hr [[id number]]     Lab Status: No result Specimen: Blood     HS Troponin 0hr (reflex protocol) [[id number]]  (Normal) Collected: 02/21/23 1759    Lab Status: Final result Specimen: Blood from Arm, Left Updated: 02/21/23 1829     hs TnI 0hr 3 ng/L     Comprehensive metabolic panel [[id number]]  (Abnormal) Collected: 02/21/23 1759    Lab Status: Final result Specimen: Blood from Arm, Left Updated: 02/21/23 1821     Sodium 138 mmol/L      Potassium 3 3 mmol/L      Chloride 100 mmol/L      CO2 32 mmol/L      ANION GAP 6 mmol/L      BUN 21 mg/dL      Creatinine 0 94 mg/dL      Glucose 110 mg/dL      Calcium 8 7 mg/dL      AST 23 U/L      ALT 24 U/L      Alkaline Phosphatase 51 U/L      Total Protein 7 1 g/dL      Albumin 4 2 g/dL      Total Bilirubin 0 24 mg/dL      eGFR 83 ml/min/1 73sq m     Narrative:      [facility] guidelines for Chronic Kidney Disease (CKD):   •  Stage 1 with normal or high GFR (GFR > 90 mL/min/1 73 square meters)  •  Stage 2 Mild CKD (GFR = 60-89 mL/min/1 73 square meters)  •  Stage 3A Moderate CKD (GFR = 45-59 mL/min/1 73 square meters)  •  Stage 3B Moderate CKD (GFR = 30-44 mL/min/1 73 square meters)  •  Stage 4 Severe CKD (GFR = 15-29 mL/min/1 73 square meters)  •  Stage 5 End Stage CKD (GFR <15 mL/min/1 73 square meters)  Note: GFR calculation is accurate only with a steady state creatinine    CBC and differential [[id number]]  (Abnormal) Collected: 02/21/23 1759    Lab Status: Final result Specimen: Blood from Arm, Left Updated: 02/21/23 1806     WBC 6 13 Thousand/uL      RBC 4 77 Million/uL      Hemoglobin 13 7 g/dL      Hematocrit 42 3 %      MCV 89 fL      MCH 28 7 pg      MCHC 32 4 g/dL      RDW 12 5 %      MPV 8 7 fL      Platelets 781 Thousands/uL      nRBC 0 /100 WBCs      Neutrophils Relative 55 %      Immat GRANS % 0 %      Lymphocytes Relative 31 %      Monocytes Relative 9 %      Eosinophils Relative 4 %      Basophils Relative 1 %      Neutrophils Absolute 3 37 Thousands/µL      Immature Grans Absolute 0 02 Thousand/uL      Lymphocytes Absolute 1 88 Thousands/µL      Monocytes Absolute 0 57 Thousand/µL      Eosinophils Absolute 0 23 Thousand/µL      Basophils Absolute 0 06 Thousands/µL                  XR chest 2 views    (Results Pending)              Procedures  Procedures         ED Course                                             Medical Decision Making  0110: Patient appears well, vital signs reviewed  Placed on the monitor  EKG shows normal sinus rhythm without acute ischemia  Plan to complete basic labs including cardiac enzymes, check chest x-ray  1845: Chest x-ray and labs reviewed  Plan to complete T2 and make disposition  Chest pain, unspecified type: acute illness or injury  Amount and/or Complexity of Data Reviewed  Labs: ordered  Radiology: ordered            Disposition  Final diagnoses:   Chest pain, unspecified type     Time reflects when diagnosis was documented in both MDM as applicable and the Disposition within this note     Time User Action Codes Description Comment 2/21/2023  8:54 PM Shanon Cazares Add [R07 9] Chest pain, unspecified type       ED Disposition     ED Disposition   Discharge    Condition   Stable    Date/Time   Tue Feb 21, 2023  8:54 PM    Comment   Adriana Lawrence discharge to home/self care                 Follow-up Information     Follow up With Specialties Details Why Maryuri Perales MD Family Medicine Schedule an appointment as soon as possible for a visit   Kevin Mai [phone]  [phone]            Discharge Medication List as of 2/21/2023  8:54 PM      CONTINUE these medications which have NOT CHANGED    Details   albuterol (PROVENTIL HFA,VENTOLIN HFA) 90 mcg/act inhaler 2-3 inhalations every 3-4 hours for 3 days and then as needed for cough, wheezing, Normal      aspirin (ECOTRIN LOW STRENGTH) 81 mg EC tablet Take by mouth, Historical Med      benzonatate (TESSALON PERLES) 100 mg capsule Take 100 mg by mouth 3 (three) times a day as needed for cough, Historical Med      docusate sodium (COLACE) 100 mg capsule Take by mouth, Historical Med      famotidine (PEPCID) 20 mg tablet Take 20 mg by mouth 2 (two) times a day, Starting Tue 8/2/2022, Historical Med      fluticasone (FLONASE) 50 mcg/act nasal spray 2 sprays by Each Nare route daily, Starting Wed 7/27/2022, Historical Med      Garlic 10 MG CAPS Historical Med      hydrochlorothiazide (MICROZIDE) 12 5 mg capsule Take 1 capsule by mouth daily, Starting Sun 6/5/2022, Historical Med      Omega-3 Fatty Acids (fish oil) 1,000 mg Take by mouth, Historical Med      pantoprazole (PROTONIX) 40 mg tablet Take by mouth, Historical Med      simvastatin (ZOCOR) 40 mg tablet Take 1 tablet by mouth daily, Starting Sun 6/5/2022, Historical Med      topiramate (TOPAMAX) 50 MG tablet Take 50 mg by mouth 2 (two) times a day, Historical Med      ziprasidone (GEODON) 80 mg capsule Take 1 capsule by mouth daily at bedtime, Starting Mon 5/9/2022, Historical Med             No discharge procedures on file      PDMP Review     None          ED Provider  Electronically Signed by           Rachelle Swanson MD  02/21/23 1683

## 2023-02-22 LAB
ATRIAL RATE: 87 BPM
P AXIS: 65 DEGREES
PR INTERVAL: 142 MS
QRS AXIS: 0 DEGREES
QRSD INTERVAL: 76 MS
QT INTERVAL: 358 MS
QTC INTERVAL: 430 MS
T WAVE AXIS: 17 DEGREES
VENTRICULAR RATE: 87 BPM

## 2023-03-22 ENCOUNTER — APPOINTMENT (OUTPATIENT)
Dept: LAB | Facility: HOSPITAL | Age: 68
End: 2023-03-22

## 2023-03-22 DIAGNOSIS — G31.84 MILD COGNITIVE IMPAIRMENT: ICD-10-CM

## 2023-03-22 LAB
25(OH)D3 SERPL-MCNC: 18.3 NG/ML (ref 30–100)
ALBUMIN SERPL BCP-MCNC: 4.6 G/DL (ref 3.5–5)
ALP SERPL-CCNC: 60 U/L (ref 34–104)
ALT SERPL W P-5'-P-CCNC: 25 U/L (ref 7–52)
ANION GAP SERPL CALCULATED.3IONS-SCNC: 7 MMOL/L (ref 4–13)
AST SERPL W P-5'-P-CCNC: 23 U/L (ref 13–39)
BILIRUB SERPL-MCNC: 0.34 MG/DL (ref 0.2–1)
BUN SERPL-MCNC: 19 MG/DL (ref 5–25)
CALCIUM SERPL-MCNC: 9.5 MG/DL (ref 8.4–10.2)
CHLORIDE SERPL-SCNC: 99 MMOL/L (ref 96–108)
CO2 SERPL-SCNC: 31 MMOL/L (ref 21–32)
CREAT SERPL-MCNC: 0.91 MG/DL (ref 0.6–1.3)
ERYTHROCYTE [DISTWIDTH] IN BLOOD BY AUTOMATED COUNT: 12.6 % (ref 11.6–15.1)
GFR SERPL CREATININE-BSD FRML MDRD: 86 ML/MIN/1.73SQ M
GLUCOSE SERPL-MCNC: 113 MG/DL (ref 65–140)
HCT VFR BLD AUTO: 44.6 % (ref 36.5–49.3)
HGB BLD-MCNC: 14.7 G/DL (ref 12–17)
MCH RBC QN AUTO: 29.5 PG (ref 26.8–34.3)
MCHC RBC AUTO-ENTMCNC: 33 G/DL (ref 31.4–37.4)
MCV RBC AUTO: 89 FL (ref 82–98)
PLATELET # BLD AUTO: 201 THOUSANDS/UL (ref 149–390)
PMV BLD AUTO: 9 FL (ref 8.9–12.7)
POTASSIUM SERPL-SCNC: 3.5 MMOL/L (ref 3.5–5.3)
PROT SERPL-MCNC: 7.5 G/DL (ref 6.4–8.4)
RBC # BLD AUTO: 4.99 MILLION/UL (ref 3.88–5.62)
SODIUM SERPL-SCNC: 137 MMOL/L (ref 135–147)
TSH SERPL DL<=0.05 MIU/L-ACNC: 1.09 UIU/ML (ref 0.45–4.5)
VIT B12 SERPL-MCNC: 1565 PG/ML (ref 100–900)
WBC # BLD AUTO: 5.71 THOUSAND/UL (ref 4.31–10.16)

## 2023-07-20 ENCOUNTER — APPOINTMENT (EMERGENCY)
Dept: CT IMAGING | Facility: HOSPITAL | Age: 68
End: 2023-07-20
Payer: COMMERCIAL

## 2023-07-20 ENCOUNTER — HOSPITAL ENCOUNTER (EMERGENCY)
Facility: HOSPITAL | Age: 68
Discharge: HOME | End: 2023-07-20
Attending: EMERGENCY MEDICINE | Admitting: EMERGENCY MEDICINE
Payer: COMMERCIAL

## 2023-07-20 VITALS
OXYGEN SATURATION: 98 % | BODY MASS INDEX: 25.61 KG/M2 | TEMPERATURE: 98.1 F | DIASTOLIC BLOOD PRESSURE: 81 MMHG | HEIGHT: 64 IN | SYSTOLIC BLOOD PRESSURE: 154 MMHG | RESPIRATION RATE: 16 BRPM | WEIGHT: 150 LBS | HEART RATE: 85 BPM

## 2023-07-20 DIAGNOSIS — Z13.9 ENCOUNTER FOR MEDICAL SCREENING EXAMINATION: Primary | ICD-10-CM

## 2023-07-20 LAB
ANION GAP SERPL CALCULATED.3IONS-SCNC: 8 MMOL/L
ATRIAL RATE: 80 BPM
BASOPHILS # BLD AUTO: 0.04 THOUSANDS/ÂΜL (ref 0–0.1)
BASOPHILS NFR BLD AUTO: 0 % (ref 0–1)
BUN SERPL-MCNC: 20 MG/DL (ref 5–25)
CALCIUM SERPL-MCNC: 9.4 MG/DL (ref 8.4–10.2)
CHLORIDE SERPL-SCNC: 99 MMOL/L (ref 96–108)
CO2 SERPL-SCNC: 28 MMOL/L (ref 21–32)
CREAT SERPL-MCNC: 0.94 MG/DL (ref 0.6–1.3)
EOSINOPHIL # BLD AUTO: 0.04 THOUSAND/ÂΜL (ref 0–0.61)
EOSINOPHIL NFR BLD AUTO: 0 % (ref 0–6)
ERYTHROCYTE [DISTWIDTH] IN BLOOD BY AUTOMATED COUNT: 13.1 % (ref 11.6–15.1)
GFR SERPL CREATININE-BSD FRML MDRD: 83 ML/MIN/1.73SQ M
GLUCOSE SERPL-MCNC: 106 MG/DL (ref 65–140)
HCT VFR BLD AUTO: 46.5 % (ref 36.5–49.3)
HGB BLD-MCNC: 15.8 G/DL (ref 12–17)
IMM GRANULOCYTES # BLD AUTO: 0.04 THOUSAND/UL (ref 0–0.2)
IMM GRANULOCYTES NFR BLD AUTO: 0 % (ref 0–2)
LYMPHOCYTES # BLD AUTO: 1.68 THOUSANDS/ÂΜL (ref 0.6–4.47)
LYMPHOCYTES NFR BLD AUTO: 18 % (ref 14–44)
MCH RBC QN AUTO: 29.6 PG (ref 26.8–34.3)
MCHC RBC AUTO-ENTMCNC: 34 G/DL (ref 31.4–37.4)
MCV RBC AUTO: 87 FL (ref 82–98)
MONOCYTES # BLD AUTO: 0.88 THOUSAND/ÂΜL (ref 0.17–1.22)
MONOCYTES NFR BLD AUTO: 10 % (ref 4–12)
NEUTROPHILS # BLD AUTO: 6.59 THOUSANDS/ÂΜL (ref 1.85–7.62)
NEUTS SEG NFR BLD AUTO: 72 % (ref 43–75)
NRBC BLD AUTO-RTO: 0 /100 WBCS
P AXIS: 58 DEGREES
PLATELET # BLD AUTO: 224 THOUSANDS/UL (ref 149–390)
PMV BLD AUTO: 9 FL (ref 8.9–12.7)
POTASSIUM SERPL-SCNC: 3.5 MMOL/L (ref 3.5–5.3)
PR INTERVAL: 136 MS
QRS AXIS: 10 DEGREES
QRSD INTERVAL: 76 MS
QT INTERVAL: 360 MS
QTC INTERVAL: 415 MS
RBC # BLD AUTO: 5.34 MILLION/UL (ref 3.88–5.62)
SODIUM SERPL-SCNC: 135 MMOL/L (ref 135–147)
T WAVE AXIS: 20 DEGREES
VENTRICULAR RATE: 80 BPM
WBC # BLD AUTO: 9.27 THOUSAND/UL (ref 4.31–10.16)

## 2023-07-20 PROCEDURE — 85025 COMPLETE CBC W/AUTO DIFF WBC: CPT | Performed by: EMERGENCY MEDICINE

## 2023-07-20 PROCEDURE — 74177 CT ABD & PELVIS W/CONTRAST: CPT

## 2023-07-20 PROCEDURE — 93005 ELECTROCARDIOGRAM TRACING: CPT

## 2023-07-20 PROCEDURE — G1004 CDSM NDSC: HCPCS

## 2023-07-20 PROCEDURE — 80048 BASIC METABOLIC PNL TOTAL CA: CPT | Performed by: EMERGENCY MEDICINE

## 2023-07-20 PROCEDURE — 93010 ELECTROCARDIOGRAM REPORT: CPT | Performed by: INTERNAL MEDICINE

## 2023-07-20 PROCEDURE — 71260 CT THORAX DX C+: CPT

## 2023-07-20 RX ADMIN — IOHEXOL 100 ML: 350 INJECTION, SOLUTION INTRAVENOUS at 13:52

## 2023-07-20 NOTE — ED PROVIDER NOTES
History  Chief Complaint   Patient presents with   • Chest Pain     Pt arrives from home stating "I was here a year ago and they told me I have cancer in my chest. They told me to eat fiber but its not working". Pt reports intermittent CP 'for a very long time'. Pt also reports hoarse voice and a cough. Pt denies seeking any treatment for cancer. Patient is a 54-year-old mental health patient presenting to the emergency room stating that he is here to "figure out what to do" about his cancer. Patient reports that he is here because "no one will believe" him. Reports that approximately 1 year ago he was told that he "has cancer."  When questioned about this patient reports that it was on a CAT scan performed in February. Patient reports that he was "so angry" today. He reports that he quit his job recently because his coworkers will not believe him that he has cancer. I did try to review the patient's CAT scan with him from February. There is no evidence of cancer on this. Patient remains insistent. History provided by:  Patient  Medical Problem  Location:  Concern for cancer  Associated symptoms: chest pain (Very intermittent)    Associated symptoms: no diarrhea, no fatigue, no fever, no nausea, no shortness of breath and no vomiting        Prior to Admission Medications   Prescriptions Last Dose Informant Patient Reported? Taking?    Garlic 10 MG CAPS   Yes Yes   Omega-3 Fatty Acids (fish oil) 1,000 mg 2023  Yes Yes   Sig: Take by mouth   albuterol (PROVENTIL HFA,VENTOLIN HFA) 90 mcg/act inhaler 2023  No Yes   Si-3 inhalations every 3-4 hours for 3 days and then as needed for cough, wheezing   aspirin (ECOTRIN LOW STRENGTH) 81 mg EC tablet 2023  Yes Yes   Sig: Take by mouth   benzonatate (TESSALON PERLES) 100 mg capsule Not Taking  Yes No   Sig: Take 100 mg by mouth 3 (three) times a day as needed for cough   Patient not taking: Reported on 2023   docusate sodium (COLACE) 100 mg capsule 2023  Yes Yes   Sig: Take by mouth   famotidine (PEPCID) 20 mg tablet 2023  Yes Yes   Sig: Take 20 mg by mouth 2 (two) times a day   fluticasone (FLONASE) 50 mcg/act nasal spray 2023  Yes Yes   Si sprays by Each Nare route daily   hydrochlorothiazide (MICROZIDE) 12.5 mg capsule 2023  Yes Yes   Sig: Take 1 capsule by mouth daily   pantoprazole (PROTONIX) 40 mg tablet 2023  Yes Yes   Sig: Take by mouth   simvastatin (ZOCOR) 40 mg tablet 2023  Yes Yes   Sig: Take 1 tablet by mouth daily   topiramate (TOPAMAX) 50 MG tablet 2023  Yes Yes   Sig: Take 50 mg by mouth 2 (two) times a day   ziprasidone (GEODON) 80 mg capsule 2023  Yes Yes   Sig: Take 1 capsule by mouth daily at bedtime      Facility-Administered Medications: None       Past Medical History:   Diagnosis Date   • Anxiety    • Hyperlipemia    • Hypertension    • Psychiatric disorder        Past Surgical History:   Procedure Laterality Date   • CHOLECYSTECTOMY     • HERNIA REPAIR         History reviewed. No pertinent family history. I have reviewed and agree with the history as documented. E-Cigarette/Vaping   • E-Cigarette Use Never User      E-Cigarette/Vaping Substances     Social History     Tobacco Use   • Smoking status: Never   • Smokeless tobacco: Never   Vaping Use   • Vaping Use: Never used   Substance Use Topics   • Alcohol use: Not Currently   • Drug use: Not Currently       Review of Systems   Constitutional: Negative for activity change, appetite change, fatigue, fever and unexpected weight change. HENT: Negative. Respiratory: Negative. Negative for shortness of breath. Cardiovascular: Positive for chest pain (Very intermittent). Gastrointestinal: Negative for diarrhea, nausea and vomiting. Frequent belching with a history of GERD   Genitourinary: Negative. Musculoskeletal: Negative. Skin: Negative.     Psychiatric/Behavioral: The patient is nervous/anxious and is hyperactive. Physical Exam  Physical Exam  Psychiatric:         Attention and Perception: Attention normal.         Mood and Affect: Mood is anxious. Affect is angry. Behavior: Behavior is agitated and hyperactive. Thought Content: Thought content is paranoid.          Vital Signs  ED Triage Vitals [07/20/23 1214]   Temperature Pulse Respirations Blood Pressure SpO2   98.1 °F (36.7 °C) 102 17 (!) 189/90 98 %      Temp Source Heart Rate Source Patient Position - Orthostatic VS BP Location FiO2 (%)   Temporal Monitor Sitting Right arm --      Pain Score       No Pain           Vitals:    07/20/23 1230 07/20/23 1300 07/20/23 1453 07/20/23 1500   BP: (!) 172/84 152/80 156/74 143/80   Pulse: 86 78 86 79   Patient Position - Orthostatic VS:   Sitting          Visual Acuity      ED Medications  Medications   iohexol (OMNIPAQUE) 350 MG/ML injection (SINGLE-DOSE) 100 mL (100 mL Intravenous Given 7/20/23 1352)       Diagnostic Studies  Results Reviewed     Procedure Component Value Units Date/Time    Basic metabolic panel [[id number]] Collected: 07/20/23 1235    Lab Status: Final result Specimen: Blood Updated: 07/20/23 1254     Sodium 135 mmol/L      Potassium 3.5 mmol/L      Chloride 99 mmol/L      CO2 28 mmol/L      ANION GAP 8 mmol/L      BUN 20 mg/dL      Creatinine 0.94 mg/dL      Glucose 106 mg/dL      Calcium 9.4 mg/dL      eGFR 83 ml/min/1.73sq m     Narrative:      [facility] guidelines for Chronic Kidney Disease (CKD):   •  Stage 1 with normal or high GFR (GFR > 90 mL/min/1.73 square meters)  •  Stage 2 Mild CKD (GFR = 60-89 mL/min/1.73 square meters)  •  Stage 3A Moderate CKD (GFR = 45-59 mL/min/1.73 square meters)  •  Stage 3B Moderate CKD (GFR = 30-44 mL/min/1.73 square meters)  •  Stage 4 Severe CKD (GFR = 15-29 mL/min/1.73 square meters)  •  Stage 5 End Stage CKD (GFR <15 mL/min/1.73 square meters)  Note: GFR calculation is accurate only with a steady state creatinine    CBC and differential [[id number]] Collected: 07/20/23 1235    Lab Status: Final result Specimen: Blood Updated: 07/20/23 1238     WBC 9.27 Thousand/uL      RBC 5.34 Million/uL      Hemoglobin 15.8 g/dL      Hematocrit 46.5 %      MCV 87 fL      MCH 29.6 pg      MCHC 34.0 g/dL      RDW 13.1 %      MPV 9.0 fL      Platelets 581 Thousands/uL      nRBC 0 /100 WBCs      Neutrophils Relative 72 %      Immat GRANS % 0 %      Lymphocytes Relative 18 %      Monocytes Relative 10 %      Eosinophils Relative 0 %      Basophils Relative 0 %      Neutrophils Absolute 6.59 Thousands/µL      Immature Grans Absolute 0.04 Thousand/uL      Lymphocytes Absolute 1.68 Thousands/µL      Monocytes Absolute 0.88 Thousand/µL      Eosinophils Absolute 0.04 Thousand/µL      Basophils Absolute 0.04 Thousands/µL                  CT chest abdomen pelvis w contrast   Final Result by Isabel Collins MD (07/20 6666)      1. No acute findings in chest, abdomen, or pelvis. 2. The common bile duct is dilated measuring 1.3 cm and there is mild central intrahepatic biliary ductal dilation, stable from prior CT 2/4/2023, which can be due to postcholecystectomy changes. Correlate with total bilirubin and alkaline phosphatase    levels. 3. A couple small pulmonary nodules. 2 mm left lower lobe nodule stable from CT AP 2/4/2023. Right middle lobe nodule not visualized on prior CT as full chest was not included in study. Based on current Fleischner Society 2017 Guidelines on incidental    pulmonary nodule, no routine follow-up is needed if the patient is considered low risk for lung cancer. If the patient is considered high risk for lung cancer, 12 month follow-up non-contrast chest CT is recommended. 4. There is an ovoid area of soft tissue density in the right inguinal canal measuring 2.2 x 1.8 cm of unclear etiology but stable since prior CT 2/4/2023, possibly postsurgical change.  Correlate for surgical history. Resident: Rosalinda Swanson, the attending radiologist, have reviewed the images and agree with the final report above. Workstation performed: [id number]                    Procedures  Procedures         ED Course  ED Course as of 07/20/23 1626   Th Jul 20, 2023   1624 No acute findings in the chest abdomen pelvis. Patient has some small nodules which can be followed up as an outpatient which have been stable as per prior CT. SBIRT 22yo+    Flowsheet Row Most Recent Value   Initial Alcohol Screen: US AUDIT-C     1. How often do you have a drink containing alcohol? 0 Filed at: 07/20/2023 1225   2. How many drinks containing alcohol do you have on a typical day you are drinking? 0 Filed at: 07/20/2023 1225   3b. FEMALE Any Age, or MALE 65+: How often do you have 4 or more drinks on one occassion? 0 Filed at: 07/20/2023 1225   Audit-C Score 0 Filed at: 07/20/2023 1225   SERGIO: How many times in the past year have you. .. Used an illegal drug or used a prescription medication for non-medical reasons? Never Filed at: 07/20/2023 1225                    Medical Decision Making  Encounter for medical screening examination: self-limited or minor problem  Amount and/or Complexity of Data Reviewed  Labs: ordered. Radiology: ordered. Risk  Prescription drug management. Disposition  Final diagnoses:   Encounter for medical screening examination     Time reflects when diagnosis was documented in both MDM as applicable and the Disposition within this note     Time User Action Codes Description Comment    7/20/2023  4:25 PM Portia Pastor Add [Z13.9] Encounter for medical screening examination       ED Disposition     ED Disposition   Discharge    Condition   Stable    Date/Time   u Jul 20, 2023  4:25 PM    Comment   Kevin Ortez discharge to home/self care.                Follow-up Information     Follow up With Specialties Details Why Contact Ana Gibbs MD Family Medicine In 2 months  [address] [facility]  [phone]            Patient's Medications   Discharge Prescriptions    No medications on file       No discharge procedures on file.     PDMP Review     None          ED Provider  Electronically Signed by           Fay Rodrigues DO  07/20/23 0840

## 2023-07-20 NOTE — DISCHARGE INSTRUCTIONS
Please follow-up with your primary care provider for any additional work-up or imaging. You may need repeat imaging in about 12 months.

## 2023-09-01 ENCOUNTER — APPOINTMENT (EMERGENCY)
Dept: RADIOLOGY | Facility: HOSPITAL | Age: 68
End: 2023-09-01
Payer: COMMERCIAL

## 2023-09-01 ENCOUNTER — HOSPITAL ENCOUNTER (EMERGENCY)
Facility: HOSPITAL | Age: 68
Discharge: HOME | End: 2023-09-02
Attending: EMERGENCY MEDICINE
Payer: COMMERCIAL

## 2023-09-01 DIAGNOSIS — R07.89 ATYPICAL CHEST PAIN: Primary | ICD-10-CM

## 2023-09-01 LAB
2HR DELTA HS TROPONIN: 0 NG/L
ALBUMIN SERPL BCP-MCNC: 4.3 G/DL (ref 3.5–5)
ALP SERPL-CCNC: 50 U/L (ref 34–104)
ALT SERPL W P-5'-P-CCNC: 23 U/L (ref 7–52)
ANION GAP SERPL CALCULATED.3IONS-SCNC: 7 MMOL/L
AST SERPL W P-5'-P-CCNC: 23 U/L (ref 13–39)
BASOPHILS # BLD AUTO: 0.04 THOUSANDS/ÂΜL (ref 0–0.1)
BASOPHILS NFR BLD AUTO: 1 % (ref 0–1)
BILIRUB SERPL-MCNC: 0.27 MG/DL (ref 0.2–1)
BUN SERPL-MCNC: 20 MG/DL (ref 5–25)
CALCIUM SERPL-MCNC: 9 MG/DL (ref 8.4–10.2)
CARDIAC TROPONIN I PNL SERPL HS: 3 NG/L
CARDIAC TROPONIN I PNL SERPL HS: 3 NG/L
CHLORIDE SERPL-SCNC: 101 MMOL/L (ref 96–108)
CO2 SERPL-SCNC: 26 MMOL/L (ref 21–32)
CREAT SERPL-MCNC: 0.79 MG/DL (ref 0.6–1.3)
EOSINOPHIL # BLD AUTO: 0.06 THOUSAND/ÂΜL (ref 0–0.61)
EOSINOPHIL NFR BLD AUTO: 1 % (ref 0–6)
ERYTHROCYTE [DISTWIDTH] IN BLOOD BY AUTOMATED COUNT: 13.3 % (ref 11.6–15.1)
GFR SERPL CREATININE-BSD FRML MDRD: 92 ML/MIN/1.73SQ M
GLUCOSE SERPL-MCNC: 111 MG/DL (ref 65–140)
HCT VFR BLD AUTO: 40.6 % (ref 36.5–49.3)
HGB BLD-MCNC: 13.8 G/DL (ref 12–17)
IMM GRANULOCYTES # BLD AUTO: 0.03 THOUSAND/UL (ref 0–0.2)
IMM GRANULOCYTES NFR BLD AUTO: 1 % (ref 0–2)
LIPASE SERPL-CCNC: <6 U/L (ref 11–82)
LYMPHOCYTES # BLD AUTO: 1.27 THOUSANDS/ÂΜL (ref 0.6–4.47)
LYMPHOCYTES NFR BLD AUTO: 21 % (ref 14–44)
MCH RBC QN AUTO: 30.1 PG (ref 26.8–34.3)
MCHC RBC AUTO-ENTMCNC: 34 G/DL (ref 31.4–37.4)
MCV RBC AUTO: 89 FL (ref 82–98)
MONOCYTES # BLD AUTO: 0.48 THOUSAND/ÂΜL (ref 0.17–1.22)
MONOCYTES NFR BLD AUTO: 8 % (ref 4–12)
NEUTROPHILS # BLD AUTO: 4.26 THOUSANDS/ÂΜL (ref 1.85–7.62)
NEUTS SEG NFR BLD AUTO: 68 % (ref 43–75)
NRBC BLD AUTO-RTO: 0 /100 WBCS
PLATELET # BLD AUTO: 182 THOUSANDS/UL (ref 149–390)
PMV BLD AUTO: 8.8 FL (ref 8.9–12.7)
POTASSIUM SERPL-SCNC: 3.5 MMOL/L (ref 3.5–5.3)
PROT SERPL-MCNC: 6.4 G/DL (ref 6.4–8.4)
RBC # BLD AUTO: 4.59 MILLION/UL (ref 3.88–5.62)
SODIUM SERPL-SCNC: 134 MMOL/L (ref 135–147)
WBC # BLD AUTO: 6.14 THOUSAND/UL (ref 4.31–10.16)

## 2023-09-01 PROCEDURE — 93005 ELECTROCARDIOGRAM TRACING: CPT

## 2023-09-01 PROCEDURE — 99285 EMERGENCY DEPT VISIT HI MDM: CPT

## 2023-09-01 PROCEDURE — 36415 COLL VENOUS BLD VENIPUNCTURE: CPT | Performed by: PHYSICIAN ASSISTANT

## 2023-09-01 PROCEDURE — 71045 X-RAY EXAM CHEST 1 VIEW: CPT

## 2023-09-01 PROCEDURE — 84484 ASSAY OF TROPONIN QUANT: CPT | Performed by: PHYSICIAN ASSISTANT

## 2023-09-01 PROCEDURE — 80053 COMPREHEN METABOLIC PANEL: CPT | Performed by: PHYSICIAN ASSISTANT

## 2023-09-01 PROCEDURE — 85025 COMPLETE CBC W/AUTO DIFF WBC: CPT | Performed by: PHYSICIAN ASSISTANT

## 2023-09-01 PROCEDURE — 99285 EMERGENCY DEPT VISIT HI MDM: CPT | Performed by: PHYSICIAN ASSISTANT

## 2023-09-01 PROCEDURE — 83690 ASSAY OF LIPASE: CPT | Performed by: PHYSICIAN ASSISTANT

## 2023-09-01 RX ORDER — LIDOCAINE HYDROCHLORIDE 20 MG/ML
15 SOLUTION OROPHARYNGEAL ONCE
Status: COMPLETED | OUTPATIENT
Start: 2023-09-01 | End: 2023-09-01

## 2023-09-01 RX ORDER — MAGNESIUM HYDROXIDE/ALUMINUM HYDROXICE/SIMETHICONE 120; 1200; 1200 MG/30ML; MG/30ML; MG/30ML
30 SUSPENSION ORAL ONCE
Status: COMPLETED | OUTPATIENT
Start: 2023-09-01 | End: 2023-09-01

## 2023-09-01 RX ADMIN — ALUMINUM HYDROXIDE, MAGNESIUM HYDROXIDE, AND DIMETHICONE 30 ML: 200; 20; 200 SUSPENSION ORAL at 18:54

## 2023-09-01 RX ADMIN — LIDOCAINE HYDROCHLORIDE 15 ML: 20 SOLUTION OROPHARYNGEAL at 18:55

## 2023-09-01 NOTE — ED PROVIDER NOTES
History  Chief Complaint   Patient presents with   • Chest Pain     Patient presents to the ED with complaints of chest pain, accompanied by acid reflux beginning around 5PM this evening. The patient is a 58-year-old male with a past medical history of hypertension, dyslipidemia, acid reflux who presents emerged department today with a chief complaint of chest pain via EMS from home. The patient states that around 5 PM he started to have "acid reflux pain" and states that he began having left sided/substernal chest pain. States that it is slowly improved. Currently rated as a 6 out of 10 pain does not radiate to the back. States he is been belching and feeling as though he is having acid reflux. Denies any new food intake. Denies any abdominal pain vomiting diarrhea fevers chills. Denies any shortness of breath. Denies smoking. States that he was convinced to be seen by his members at home. Chest Pain  Pain location:  Substernal area  Pain radiates to:  Does not radiate  Pain radiates to the back: no    Pain severity:  Moderate  Timing:  Constant  Progression:  Improving  Chronicity:  New  Context: at rest    Relieved by:  None tried  Worsened by:  Nothing tried  Ineffective treatments:  None tried  Associated symptoms: no abdominal pain, no altered mental status, no anorexia, no back pain, no cough, no diaphoresis, no dizziness, no dysphagia, no fever, no heartburn, no near-syncope, no palpitations and no weakness        Prior to Admission Medications   Prescriptions Last Dose Informant Patient Reported? Taking?    Omega-3 Fatty Acids (fish oil) 1,000 mg   Yes No   Sig: Take by mouth   albuterol (PROVENTIL HFA,VENTOLIN HFA) 90 mcg/act inhaler   No No   Si-3 inhalations every 3-4 hours for 3 days and then as needed for cough, wheezing   aspirin (ECOTRIN LOW STRENGTH) 81 mg EC tablet   Yes Yes   Sig: Take by mouth   docusate sodium (COLACE) 100 mg capsule   Yes Yes   Sig: Take by mouth famotidine (PEPCID) 20 mg tablet   Yes Yes   Sig: Take 20 mg by mouth 2 (two) times a day   fluticasone (FLONASE) 50 mcg/act nasal spray   Yes Yes   Si sprays by Each Nare route daily   hydrochlorothiazide (MICROZIDE) 12.5 mg capsule   Yes Yes   Sig: Take 1 capsule by mouth daily   pantoprazole (PROTONIX) 40 mg tablet   Yes Yes   Sig: Take by mouth   simvastatin (ZOCOR) 40 mg tablet   Yes Yes   Sig: Take 1 tablet by mouth daily   topiramate (TOPAMAX) 50 MG tablet   Yes No   Sig: Take 50 mg by mouth 2 (two) times a day   ziprasidone (GEODON) 80 mg capsule   Yes Yes   Sig: Take 1 capsule by mouth daily at bedtime      Facility-Administered Medications: None       Past Medical History:   Diagnosis Date   • Anxiety    • Hyperlipemia    • Hypertension    • Psychiatric disorder        Past Surgical History:   Procedure Laterality Date   • CHOLECYSTECTOMY     • HERNIA REPAIR         History reviewed. No pertinent family history. I have reviewed and agree with the history as documented. E-Cigarette/Vaping   • E-Cigarette Use Never User      E-Cigarette/Vaping Substances     Social History     Tobacco Use   • Smoking status: Never   • Smokeless tobacco: Never   Vaping Use   • Vaping Use: Never used   Substance Use Topics   • Alcohol use: Not Currently   • Drug use: Not Currently       Review of Systems   Constitutional: Negative for diaphoresis and fever. HENT: Negative for trouble swallowing. Respiratory: Negative for cough. Cardiovascular: Positive for chest pain. Negative for palpitations and near-syncope. Gastrointestinal: Negative for abdominal pain, anorexia and heartburn. Musculoskeletal: Negative for back pain. Neurological: Negative for dizziness and weakness. All other systems reviewed and are negative. Physical Exam  Physical Exam  Vitals and nursing note reviewed. Constitutional:       General: He is not in acute distress. Appearance: He is well-developed.    HENT:      Head: Normocephalic and atraumatic. Eyes:      Conjunctiva/sclera: Conjunctivae normal.   Cardiovascular:      Rate and Rhythm: Normal rate and regular rhythm. Heart sounds: Normal heart sounds. No murmur heard. Pulmonary:      Effort: Pulmonary effort is normal. No respiratory distress. Breath sounds: Normal breath sounds. Chest:      Chest wall: No tenderness. Abdominal:      Palpations: Abdomen is soft. Tenderness: There is no abdominal tenderness. Musculoskeletal:         General: No swelling. Cervical back: Neck supple. Right lower leg: No edema. Left lower leg: No edema. Skin:     General: Skin is warm and dry. Capillary Refill: Capillary refill takes less than 2 seconds. Neurological:      General: No focal deficit present. Mental Status: He is alert and oriented to person, place, and time.    Psychiatric:         Mood and Affect: Mood normal.         Vital Signs  ED Triage Vitals [09/01/23 1848]   Temperature Pulse Respirations Blood Pressure SpO2   97.9 °F (36.6 °C) 92 16 136/73 97 %      Temp Source Heart Rate Source Patient Position - Orthostatic VS BP Location FiO2 (%)   Temporal Monitor Lying Left arm --      Pain Score       6           Vitals:    09/01/23 1848   BP: 136/73   Pulse: 92   Patient Position - Orthostatic VS: Lying         Visual Acuity      ED Medications  Medications   Lidocaine Viscous HCl (XYLOCAINE) 2 % mucosal solution 15 mL (15 mL Swish & Spit Given 9/1/23 1855)   aluminum-magnesium hydroxide-simethicone (MAALOX) oral suspension 30 mL (30 mL Oral Given 9/1/23 1854)       Diagnostic Studies  Results Reviewed     Procedure Component Value Units Date/Time    Comprehensive metabolic panel [[id number]]  (Abnormal) Collected: 09/01/23 1852    Lab Status: Final result Specimen: Blood from Arm, Right Updated: 09/01/23 1921     Sodium 134 mmol/L      Potassium 3.5 mmol/L      Chloride 101 mmol/L      CO2 26 mmol/L      ANION GAP 7 mmol/L      BUN 20 mg/dL      Creatinine 0.79 mg/dL      Glucose 111 mg/dL      Calcium 9.0 mg/dL      AST 23 U/L      ALT 23 U/L      Alkaline Phosphatase 50 U/L      Total Protein 6.4 g/dL      Albumin 4.3 g/dL      Total Bilirubin 0.27 mg/dL      eGFR 92 ml/min/1.73sq m     Narrative:      [facility] guidelines for Chronic Kidney Disease (CKD):   •  Stage 1 with normal or high GFR (GFR > 90 mL/min/1.73 square meters)  •  Stage 2 Mild CKD (GFR = 60-89 mL/min/1.73 square meters)  •  Stage 3A Moderate CKD (GFR = 45-59 mL/min/1.73 square meters)  •  Stage 3B Moderate CKD (GFR = 30-44 mL/min/1.73 square meters)  •  Stage 4 Severe CKD (GFR = 15-29 mL/min/1.73 square meters)  •  Stage 5 End Stage CKD (GFR <15 mL/min/1.73 square meters)  Note: GFR calculation is accurate only with a steady state creatinine    Lipase [[id number]]  (Abnormal) Collected: 09/01/23 1852    Lab Status: Final result Specimen: Blood from Arm, Right Updated: 09/01/23 1921     Lipase <6 u/L     HS Troponin 0hr (reflex protocol) [[id number]]  (Normal) Collected: 09/01/23 1852    Lab Status: Final result Specimen: Blood from Arm, Right Updated: 09/01/23 1918     hs TnI 0hr 3 ng/L     HS Troponin I 2hr [[id number]]     Lab Status: No result Specimen: Blood     CBC and differential [[id number]]  (Abnormal) Collected: 09/01/23 1852    Lab Status: Final result Specimen: Blood from Arm, Right Updated: 09/01/23 1856     WBC 6.14 Thousand/uL      RBC 4.59 Million/uL      Hemoglobin 13.8 g/dL      Hematocrit 40.6 %      MCV 89 fL      MCH 30.1 pg      MCHC 34.0 g/dL      RDW 13.3 %      MPV 8.8 fL      Platelets 623 Thousands/uL      nRBC 0 /100 WBCs      Neutrophils Relative 68 %      Immat GRANS % 1 %      Lymphocytes Relative 21 %      Monocytes Relative 8 %      Eosinophils Relative 1 %      Basophils Relative 1 %      Neutrophils Absolute 4.26 Thousands/µL      Immature Grans Absolute 0.03 Thousand/uL      Lymphocytes Absolute 1.27 Thousands/µL Monocytes Absolute 0.48 Thousand/µL      Eosinophils Absolute 0.06 Thousand/µL      Basophils Absolute 0.04 Thousands/µL                  XR chest 1 view portable    (Results Pending)              Procedures  ECG 12 Lead Documentation Only    Date/Time: 9/1/2023 7:07 PM    Performed by: Syed Pickard PA-C  Authorized by: Syed Pickard PA-C    Indications / Diagnosis:  Cp  ECG reviewed by me, the ED Provider: yes    Patient location:  ED  Previous ECG:     Previous ECG:  Unavailable    Comparison to cardiac monitor: Yes    Interpretation:     Interpretation: normal    Rate:     ECG rate:  94    ECG rate assessment: normal    Rhythm:     Rhythm: sinus rhythm    ST segments:     ST segments:  Normal  T waves:     T waves: normal               ED Course  ED Course as of 09/01/23 2039   [facility] Sep 01, 2023   1939 hs TnI 0hr: 3   2008 Pain free after GI cocktail    2037 8:37 PM  Signed out remainder of care to Dr. Jese Resendiz    History 0 Filed at: 09/01/2023 2010   ECG 0 Filed at: 09/01/2023 2010   Age 2 Filed at: 09/01/2023 2010   Risk Factors 2 Filed at: 09/01/2023 2010   Troponin 0 Filed at: 09/01/2023 2010   HEART Score 4 Filed at: 09/01/2023 2010                        SBIRT 22yo+    Flowsheet Row Most Recent Value   Initial Alcohol Screen: US AUDIT-C     1. How often do you have a drink containing alcohol? 0 Filed at: 09/01/2023 1850   2. How many drinks containing alcohol do you have on a typical day you are drinking? 0 Filed at: 09/01/2023 1850   3a. Male UNDER 65: How often do you have five or more drinks on one occasion? 0 Filed at: 09/01/2023 1850   3b. FEMALE Any Age, or MALE 65+: How often do you have 4 or more drinks on one occassion? 0 Filed at: 09/01/2023 1850   Audit-C Score 0 Filed at: 09/01/2023 1850   SERGIO: How many times in the past year have you. ..     Used an illegal drug or used a prescription medication for non-medical reasons? Never Filed at: 09/01/2023 12446 Radha Martinez  The patient is a 45-year-old male with a past medical history of hypertension, dyslipidemia, acid reflux who presents emerged department today with a chief complaint of chest pain via EMS from home. The patient states that around 5 PM he started to have "acid reflux pain" and states that he began having left sided/substernal chest pain. States that it is slowly improved. Currently rated as a 6 out of 10 pain does not radiate to the back. States he is been belching and feeling as though he is having acid reflux. Denies any new food intake. Denies any abdominal pain vomiting diarrhea fevers chills. Denies any shortness of breath. Denies smoking. States that he was convinced to be seen by his members at home.    ekg normal   she does belch and on examination states it feels similar to his acid reflux    Labs normal    painfree after GI cocktail    Repeat trop    CXR NAD    Differential Diagnosis included but was not limited to : Pneumonia, ACS, costochondritis, musculoskeletal pain, GERD, esophageal spasm, biliary colic       Most likely GI etiology, recs follow up with PCP or return as needed     8:39 PM  Signed out remainder of care to Dr. Devin Tierney pending second troponin       Amount and/or Complexity of Data Reviewed  Labs: ordered. Decision-making details documented in ED Course. Radiology: ordered. Decision-making details documented in ED Course. ECG/medicine tests: ordered. Decision-making details documented in ED Course. Risk  OTC drugs. Prescription drug management.           Disposition  Final diagnoses:   Atypical chest pain     Time reflects when diagnosis was documented in both MDM as applicable and the Disposition within this note     Time User Action Codes Description Comment    9/1/2023  7:53 PM Maryann Rodney Add [R07.89] Atypical chest pain       ED Disposition     ED Disposition   Discharge    Condition   Stable    Date/Time   Fri Sep 1, 2023  8:11 PM    Comment   Sean Stahl discharge to home/self care. Follow-up Information     Follow up With Specialties Details Why Karley Barbosa MD Family Medicine Schedule an appointment as soon as possible for a visit  As needed [address]  [phone]            Patient's Medications   Discharge Prescriptions    No medications on file       No discharge procedures on file.     PDMP Review     None          ED Provider  Electronically Signed by           Joel Zhou PA-C  09/01/23 2039

## 2023-09-02 VITALS
DIASTOLIC BLOOD PRESSURE: 61 MMHG | HEART RATE: 65 BPM | OXYGEN SATURATION: 97 % | RESPIRATION RATE: 18 BRPM | TEMPERATURE: 97.9 F | BODY MASS INDEX: 23.9 KG/M2 | SYSTOLIC BLOOD PRESSURE: 130 MMHG | WEIGHT: 140 LBS | HEIGHT: 64 IN

## 2023-09-02 RX ORDER — GUAIFENESIN/DEXTROMETHORPHAN 100-10MG/5
10 SYRUP ORAL ONCE
Status: DISCONTINUED | OUTPATIENT
Start: 2023-09-02 | End: 2023-09-02 | Stop reason: HOSPADM

## 2023-09-02 NOTE — ED CARE HANDOFF
Emergency Department Sign Out Note        Sign out and transfer of care from [facility]. See Separate Emergency Department note. The patient, Preeti Ruggiero, was evaluated by the previous provider for chest pain. Workup Completed:  Initial evaluation with the first troponin negative. Patient's chest pain resolved after treatment for GERD    ED Course / Workup Pending (followup): Awaiting repeat troponin.   This is now negative and patient remains chest pain-free, appropriate for discharge      HEART Risk Score    Flowsheet Row Most Recent Value   Heart Score Risk Calculator    History 0 Filed at: 09/01/2023 2010   ECG 0 Filed at: 09/01/2023 2010   Age 2 Filed at: 09/01/2023 2010   Risk Factors 2 Filed at: 09/01/2023 2010   Troponin 0 Filed at: 09/01/2023 2010   HEART Score 4 Filed at: 09/01/2023 2010          XR chest 1 view portable   ED Interpretation   No acute finding        Results for orders placed or performed during the hospital encounter of 09/01/23   CBC and differential   Result Value Ref Range    WBC 6.14 4.31 - 10.16 Thousand/uL    RBC 4.59 3.88 - 5.62 Million/uL    Hemoglobin 13.8 12.0 - 17.0 g/dL    Hematocrit 40.6 36.5 - 49.3 %    MCV 89 82 - 98 fL    MCH 30.1 26.8 - 34.3 pg    MCHC 34.0 31.4 - 37.4 g/dL    RDW 13.3 11.6 - 15.1 %    MPV 8.8 (L) 8.9 - 12.7 fL    Platelets 841 713 - 739 Thousands/uL    nRBC 0 /100 WBCs    Neutrophils Relative 68 43 - 75 %    Immat GRANS % 1 0 - 2 %    Lymphocytes Relative 21 14 - 44 %    Monocytes Relative 8 4 - 12 %    Eosinophils Relative 1 0 - 6 %    Basophils Relative 1 0 - 1 %    Neutrophils Absolute 4.26 1.85 - 7.62 Thousands/µL    Immature Grans Absolute 0.03 0.00 - 0.20 Thousand/uL    Lymphocytes Absolute 1.27 0.60 - 4.47 Thousands/µL    Monocytes Absolute 0.48 0.17 - 1.22 Thousand/µL    Eosinophils Absolute 0.06 0.00 - 0.61 Thousand/µL    Basophils Absolute 0.04 0.00 - 0.10 Thousands/µL   Comprehensive metabolic panel   Result Value Ref Range    Sodium 134 (L) 135 - 147 mmol/L    Potassium 3.5 3.5 - 5.3 mmol/L    Chloride 101 96 - 108 mmol/L    CO2 26 21 - 32 mmol/L    ANION GAP 7 mmol/L    BUN 20 5 - 25 mg/dL    Creatinine 0.79 0.60 - 1.30 mg/dL    Glucose 111 65 - 140 mg/dL    Calcium 9.0 8.4 - 10.2 mg/dL    AST 23 13 - 39 U/L    ALT 23 7 - 52 U/L    Alkaline Phosphatase 50 34 - 104 U/L    Total Protein 6.4 6.4 - 8.4 g/dL    Albumin 4.3 3.5 - 5.0 g/dL    Total Bilirubin 0.27 0.20 - 1.00 mg/dL    eGFR 92 ml/min/1.73sq m   HS Troponin 0hr (reflex protocol)   Result Value Ref Range    hs TnI 0hr 3 "Refer to ACS Flowchart"- see link ng/L   Lipase   Result Value Ref Range    Lipase <6 (L) 11 - 82 u/L   HS Troponin I 2hr   Result Value Ref Range    hs TnI 2hr 3 "Refer to ACS Flowchart"- see link ng/L    Delta 2hr hsTnI 0 <20 ng/L                                Procedures  MDM        Disposition  Final diagnoses:   Atypical chest pain     Time reflects when diagnosis was documented in both MDM as applicable and the Disposition within this note     Time User Action Codes Description Comment    9/1/2023  7:53 PM Margarita Mckee Add [R07.89] Atypical chest pain       ED Disposition     ED Disposition   Discharge    Condition   Stable    Date/Time   Fri Sep 1, 2023  8:11 PM    Comment   Timi Tang discharge to home/self care. Follow-up Information     Follow up With Specialties Details Why Jose L Burden MD Family Medicine Schedule an appointment as soon as possible for a visit  As needed [address]  [phone]          Patient's Medications   Discharge Prescriptions    No medications on file     No discharge procedures on file.        ED Provider  Electronically Signed by     Leida Johnson MD  09/01/23 1124

## 2023-09-02 NOTE — ED NOTES
Pt. Harvey Hope in room, asked for extra pillow, pt.  Ambulated to bathroom without difficulty or chest pain, continues to await transport home      Kristyn Shepard RN  09/02/23 1785

## 2023-09-02 NOTE — ED NOTES
Pt had been ambulating to restroom with one assist pt resting comfortably waiting on       Milla Flores  09/02/23 7171

## 2023-09-05 LAB
ATRIAL RATE: 94 BPM
P AXIS: 66 DEGREES
PR INTERVAL: 136 MS
QRS AXIS: 0 DEGREES
QRSD INTERVAL: 76 MS
QT INTERVAL: 364 MS
QTC INTERVAL: 455 MS
T WAVE AXIS: 21 DEGREES
VENTRICULAR RATE: 94 BPM

## 2023-10-02 ENCOUNTER — APPOINTMENT (EMERGENCY)
Dept: RADIOLOGY | Facility: HOSPITAL | Age: 68
End: 2023-10-02
Payer: COMMERCIAL

## 2023-10-02 ENCOUNTER — HOSPITAL ENCOUNTER (EMERGENCY)
Facility: HOSPITAL | Age: 68
Discharge: HOME | End: 2023-10-02
Attending: EMERGENCY MEDICINE | Admitting: EMERGENCY MEDICINE
Payer: COMMERCIAL

## 2023-10-02 VITALS
WEIGHT: 156.31 LBS | HEIGHT: 64 IN | TEMPERATURE: 97.2 F | OXYGEN SATURATION: 99 % | DIASTOLIC BLOOD PRESSURE: 73 MMHG | RESPIRATION RATE: 30 BRPM | HEART RATE: 94 BPM | BODY MASS INDEX: 26.69 KG/M2 | SYSTOLIC BLOOD PRESSURE: 153 MMHG

## 2023-10-02 DIAGNOSIS — R06.00 DYSPNEA: Primary | ICD-10-CM

## 2023-10-02 LAB
ALBUMIN SERPL BCP-MCNC: 4.5 G/DL (ref 3.5–5)
ALP SERPL-CCNC: 60 U/L (ref 34–104)
ALT SERPL W P-5'-P-CCNC: 27 U/L (ref 7–52)
ANION GAP SERPL CALCULATED.3IONS-SCNC: 8 MMOL/L
AST SERPL W P-5'-P-CCNC: 25 U/L (ref 13–39)
ATRIAL RATE: 85 BPM
BASOPHILS # BLD AUTO: 0.06 THOUSANDS/ÂΜL (ref 0–0.1)
BASOPHILS NFR BLD AUTO: 1 % (ref 0–1)
BILIRUB SERPL-MCNC: 0.28 MG/DL (ref 0.2–1)
BNP SERPL-MCNC: 24 PG/ML (ref 0–100)
BUN SERPL-MCNC: 16 MG/DL (ref 5–25)
CALCIUM SERPL-MCNC: 9 MG/DL (ref 8.4–10.2)
CARDIAC TROPONIN I PNL SERPL HS: 3 NG/L
CHLORIDE SERPL-SCNC: 96 MMOL/L (ref 96–108)
CO2 SERPL-SCNC: 30 MMOL/L (ref 21–32)
CREAT SERPL-MCNC: 0.88 MG/DL (ref 0.6–1.3)
EOSINOPHIL # BLD AUTO: 0.16 THOUSAND/ÂΜL (ref 0–0.61)
EOSINOPHIL NFR BLD AUTO: 2 % (ref 0–6)
ERYTHROCYTE [DISTWIDTH] IN BLOOD BY AUTOMATED COUNT: 12.8 % (ref 11.6–15.1)
FLUAV RNA RESP QL NAA+PROBE: NEGATIVE
FLUBV RNA RESP QL NAA+PROBE: NEGATIVE
GFR SERPL CREATININE-BSD FRML MDRD: 88 ML/MIN/1.73SQ M
GLUCOSE SERPL-MCNC: 114 MG/DL (ref 65–140)
HCT VFR BLD AUTO: 42.5 % (ref 36.5–49.3)
HGB BLD-MCNC: 14.5 G/DL (ref 12–17)
IMM GRANULOCYTES # BLD AUTO: 0.04 THOUSAND/UL (ref 0–0.2)
IMM GRANULOCYTES NFR BLD AUTO: 1 % (ref 0–2)
LYMPHOCYTES # BLD AUTO: 2.15 THOUSANDS/ÂΜL (ref 0.6–4.47)
LYMPHOCYTES NFR BLD AUTO: 25 % (ref 14–44)
MCH RBC QN AUTO: 30.4 PG (ref 26.8–34.3)
MCHC RBC AUTO-ENTMCNC: 34.1 G/DL (ref 31.4–37.4)
MCV RBC AUTO: 89 FL (ref 82–98)
MONOCYTES # BLD AUTO: 0.7 THOUSAND/ÂΜL (ref 0.17–1.22)
MONOCYTES NFR BLD AUTO: 8 % (ref 4–12)
NEUTROPHILS # BLD AUTO: 5.41 THOUSANDS/ÂΜL (ref 1.85–7.62)
NEUTS SEG NFR BLD AUTO: 63 % (ref 43–75)
NRBC BLD AUTO-RTO: 0 /100 WBCS
P AXIS: 62 DEGREES
PLATELET # BLD AUTO: 211 THOUSANDS/UL (ref 149–390)
PMV BLD AUTO: 8.7 FL (ref 8.9–12.7)
POTASSIUM SERPL-SCNC: 3.6 MMOL/L (ref 3.5–5.3)
PR INTERVAL: 142 MS
PROT SERPL-MCNC: 7.2 G/DL (ref 6.4–8.4)
QRS AXIS: -12 DEGREES
QRSD INTERVAL: 72 MS
QT INTERVAL: 362 MS
QTC INTERVAL: 430 MS
RBC # BLD AUTO: 4.77 MILLION/UL (ref 3.88–5.62)
RSV RNA RESP QL NAA+PROBE: NEGATIVE
SARS-COV-2 RNA RESP QL NAA+PROBE: NEGATIVE
SODIUM SERPL-SCNC: 134 MMOL/L (ref 135–147)
T WAVE AXIS: 24 DEGREES
VENTRICULAR RATE: 85 BPM
WBC # BLD AUTO: 8.52 THOUSAND/UL (ref 4.31–10.16)

## 2023-10-02 PROCEDURE — 84484 ASSAY OF TROPONIN QUANT: CPT | Performed by: EMERGENCY MEDICINE

## 2023-10-02 PROCEDURE — 99285 EMERGENCY DEPT VISIT HI MDM: CPT

## 2023-10-02 PROCEDURE — 94640 AIRWAY INHALATION TREATMENT: CPT

## 2023-10-02 PROCEDURE — 36415 COLL VENOUS BLD VENIPUNCTURE: CPT

## 2023-10-02 PROCEDURE — 0241U HB NFCT DS VIR RESP RNA 4 TRGT: CPT | Performed by: EMERGENCY MEDICINE

## 2023-10-02 PROCEDURE — 99285 EMERGENCY DEPT VISIT HI MDM: CPT | Performed by: EMERGENCY MEDICINE

## 2023-10-02 PROCEDURE — 71045 X-RAY EXAM CHEST 1 VIEW: CPT

## 2023-10-02 PROCEDURE — 93005 ELECTROCARDIOGRAM TRACING: CPT

## 2023-10-02 PROCEDURE — 85025 COMPLETE CBC W/AUTO DIFF WBC: CPT | Performed by: EMERGENCY MEDICINE

## 2023-10-02 PROCEDURE — 80053 COMPREHEN METABOLIC PANEL: CPT | Performed by: EMERGENCY MEDICINE

## 2023-10-02 PROCEDURE — 83880 ASSAY OF NATRIURETIC PEPTIDE: CPT | Performed by: EMERGENCY MEDICINE

## 2023-10-02 RX ORDER — DIPHENOXYLATE HYDROCHLORIDE AND ATROPINE SULFATE 2.5; .025 MG/1; MG/1
1 TABLET ORAL DAILY
COMMUNITY

## 2023-10-02 RX ORDER — IPRATROPIUM BROMIDE AND ALBUTEROL SULFATE 2.5; .5 MG/3ML; MG/3ML
3 SOLUTION RESPIRATORY (INHALATION) ONCE
Status: COMPLETED | OUTPATIENT
Start: 2023-10-02 | End: 2023-10-02

## 2023-10-02 RX ADMIN — IPRATROPIUM BROMIDE AND ALBUTEROL SULFATE 3 ML: .5; 3 SOLUTION RESPIRATORY (INHALATION) at 15:59

## 2023-10-02 NOTE — ED PROVIDER NOTES
History  Chief Complaint   Patient presents with   • Shortness of Breath     Pt. Was waking back from store earlier today and developed sob, 92% on 2 L , pt. Was in no acute distress upon EMS arrival      Patient states that he "could not catch my breath". This occurred when he was walking carrying many heavy groceries. Lasted approximately 30 minutes and resolved. Did not have any chest pain or fever. Has had no coughing. Now is without complaint. History provided by:  Patient   used: No    Shortness of Breath  Severity:  Mild  Onset quality:  Gradual  Timing:  Constant  Progression:  Resolved  Chronicity:  New  Relieved by:  Nothing  Worsened by:  Nothing  Ineffective treatments:  None tried  Associated symptoms: no abdominal pain, no chest pain, no cough, no ear pain, no fever, no headaches, no hemoptysis, no neck pain, no rash, no sore throat, no sputum production, no syncope, no vomiting and no wheezing        Prior to Admission Medications   Prescriptions Last Dose Informant Patient Reported? Taking?    Omega-3 Fatty Acids (fish oil) 1,000 mg 10/2/2023  Yes Yes   Sig: Take by mouth   albuterol (PROVENTIL HFA,VENTOLIN HFA) 90 mcg/act inhaler 10/2/2023  No Yes   Si-3 inhalations every 3-4 hours for 3 days and then as needed for cough, wheezing   aspirin (ECOTRIN LOW STRENGTH) 81 mg EC tablet 10/2/2023  Yes Yes   Sig: Take by mouth   docusate sodium (COLACE) 100 mg capsule   Yes No   Sig: Take by mouth   famotidine (PEPCID) 20 mg tablet 10/2/2023  Yes Yes   Sig: Take 20 mg by mouth 2 (two) times a day   fluticasone (FLONASE) 50 mcg/act nasal spray 10/2/2023  Yes Yes   Si sprays by Each Nare route daily   hydrochlorothiazide (MICROZIDE) 12.5 mg capsule 10/2/2023  Yes Yes   Sig: Take 1 capsule by mouth daily   multivitamin (THERAGRAN) TABS 10/2/2023  Yes Yes   Sig: Take 1 tablet by mouth daily   pantoprazole (PROTONIX) 40 mg tablet 10/2/2023  Yes Yes   Sig: Take by mouth simvastatin (ZOCOR) 40 mg tablet 10/2/2023  Yes Yes   Sig: Take 1 tablet by mouth daily   topiramate (TOPAMAX) 50 MG tablet 10/2/2023  Yes Yes   Sig: Take 50 mg by mouth 2 (two) times a day   ziprasidone (GEODON) 80 mg capsule 10/2/2023  Yes Yes   Sig: Take 1 capsule by mouth daily at bedtime      Facility-Administered Medications: None       Past Medical History:   Diagnosis Date   • Anxiety    • Hyperlipemia    • Hypertension    • Psychiatric disorder        Past Surgical History:   Procedure Laterality Date   • CHOLECYSTECTOMY     • HERNIA REPAIR         History reviewed. No pertinent family history. I have reviewed and agree with the history as documented. E-Cigarette/Vaping   • E-Cigarette Use Never User      E-Cigarette/Vaping Substances   • Nicotine No    • THC No    • CBD No    • Flavoring No      Social History     Tobacco Use   • Smoking status: Former     Types: Cigarettes     Passive exposure: Never   • Smokeless tobacco: Never   Vaping Use   • Vaping Use: Never used   Substance Use Topics   • Alcohol use: Yes     Comment: rare   • Drug use: Not Currently       Review of Systems   Constitutional: Negative for chills and fever. HENT: Negative for ear pain, hearing loss, sore throat, trouble swallowing and voice change. Eyes: Negative for pain and discharge. Respiratory: Positive for shortness of breath. Negative for cough, hemoptysis, sputum production and wheezing. Cardiovascular: Negative for chest pain, palpitations and syncope. Gastrointestinal: Negative for abdominal pain, blood in stool, constipation, diarrhea, nausea and vomiting. Genitourinary: Negative for dysuria, flank pain, frequency and hematuria. Musculoskeletal: Negative for joint swelling, neck pain and neck stiffness. Skin: Negative for rash and wound. Neurological: Negative for dizziness, seizures, syncope, facial asymmetry and headaches.    Psychiatric/Behavioral: Negative for hallucinations, self-injury and suicidal ideas. All other systems reviewed and are negative. Physical Exam  Physical Exam  Vitals and nursing note reviewed. Constitutional:       General: He is not in acute distress. Appearance: He is well-developed. HENT:      Head: Normocephalic and atraumatic. Right Ear: External ear normal.      Left Ear: External ear normal.   Eyes:      General: No scleral icterus. Right eye: No discharge. Left eye: No discharge. Extraocular Movements: Extraocular movements intact. Conjunctiva/sclera: Conjunctivae normal.   Cardiovascular:      Rate and Rhythm: Normal rate and regular rhythm. Heart sounds: Normal heart sounds. No murmur heard. Pulmonary:      Effort: Pulmonary effort is normal.      Breath sounds: Normal breath sounds. No decreased breath sounds, wheezing, rhonchi or rales. Abdominal:      General: Bowel sounds are normal. There is no distension. Palpations: Abdomen is soft. Tenderness: There is no abdominal tenderness. There is no guarding or rebound. Musculoskeletal:         General: No deformity. Normal range of motion. Cervical back: Normal range of motion and neck supple. Skin:     General: Skin is warm and dry. Findings: No rash. Neurological:      General: No focal deficit present. Mental Status: He is alert and oriented to person, place, and time. Cranial Nerves: No cranial nerve deficit. Psychiatric:         Mood and Affect: Mood normal.         Behavior: Behavior normal.         Thought Content:  Thought content normal.         Judgment: Judgment normal.         Vital Signs  ED Triage Vitals [10/02/23 1437]   Temperature Pulse Respirations Blood Pressure SpO2   (!) 97.2 °F (36.2 °C) 92 20 153/73 99 %      Temp Source Heart Rate Source Patient Position - Orthostatic VS BP Location FiO2 (%)   Temporal Monitor Sitting Left arm --      Pain Score       --           Vitals:    10/02/23 1500 10/02/23 1515 10/02/23 1530 10/02/23 1545   BP:       Pulse: 96 92 101 90   Patient Position - Orthostatic VS:             Visual Acuity      ED Medications  Medications   ipratropium-albuterol (DUO-NEB) 0.5-2.5 mg/3 mL inhalation solution 3 mL (has no administration in time range)       Diagnostic Studies  Results Reviewed     Procedure Component Value Units Date/Time    COVID/FLU/RSV [[id number]]  (Normal) Collected: 10/02/23 1446    Lab Status: Final result Specimen: Nares from Nose Updated: 10/02/23 1531     SARS-CoV-2 Negative     INFLUENZA A PCR Negative     INFLUENZA B PCR Negative     RSV PCR Negative    Narrative:      FOR PEDIATRIC PATIENTS - copy/paste COVID Guidelines URL to browser: [email or web address] ashx    SARS-CoV-2 assay is a Nucleic Acid Amplification assay intended for the  qualitative detection of nucleic acid from SARS-CoV-2 in nasopharyngeal  swabs. Results are for the presumptive identification of SARS-CoV-2 RNA. Positive results are indicative of infection with SARS-CoV-2, the virus  causing COVID-19, but do not rule out bacterial infection or co-infection  with other viruses. Laboratories within the [facility] and its  territories are required to report all positive results to the appropriate  public health authorities. Negative results do not preclude SARS-CoV-2  infection and should not be used as the sole basis for treatment or other  patient management decisions. Negative results must be combined with  clinical observations, patient history, and epidemiological information. This test has not been FDA cleared or approved. This test has been authorized by FDA under an Emergency Use Authorization  (EUA).  This test is only authorized for the duration of time the  declaration that circumstances exist justifying the authorization of the  emergency use of an in vitro diagnostic tests for detection of SARS-CoV-2  virus and/or diagnosis of COVID-19 infection under section 564(b)(1) of  the Act, 21 U. S.C. 190LIY-3(X)(5), unless the authorization is terminated  or revoked sooner. The test has been validated but independent review by FDA  and CLIA is pending. Test performed using Novatrispert: This RT-PCR assay targets N2,  a region unique to SARS-CoV-2. A conserved region in the E-gene was chosen  for pan-Sarbecovirus detection which includes SARS-CoV-2. According to CMS-2020-01-R, this platform meets the definition of high-throughput technology.     B-Type Natriuretic Peptide(BNP) [[id number]]  (Normal) Collected: 10/02/23 1446    Lab Status: Final result Specimen: Blood from Arm, Left Updated: 10/02/23 1530     BNP 24 pg/mL     HS Troponin 0hr (reflex protocol) [[id number]]  (Normal) Collected: 10/02/23 1446    Lab Status: Final result Specimen: Blood from Arm, Left Updated: 10/02/23 1520     hs TnI 0hr 3 ng/L     Comprehensive metabolic panel [[id number]]  (Abnormal) Collected: 10/02/23 1446    Lab Status: Final result Specimen: Blood from Arm, Left Updated: 10/02/23 1511     Sodium 134 mmol/L      Potassium 3.6 mmol/L      Chloride 96 mmol/L      CO2 30 mmol/L      ANION GAP 8 mmol/L      BUN 16 mg/dL      Creatinine 0.88 mg/dL      Glucose 114 mg/dL      Calcium 9.0 mg/dL      AST 25 U/L      ALT 27 U/L      Alkaline Phosphatase 60 U/L      Total Protein 7.2 g/dL      Albumin 4.5 g/dL      Total Bilirubin 0.28 mg/dL      eGFR 88 ml/min/1.73sq m     Narrative:      [place] guidelines for Chronic Kidney Disease (CKD):   •  Stage 1 with normal or high GFR (GFR > 90 mL/min/1.73 square meters)  •  Stage 2 Mild CKD (GFR = 60-89 mL/min/1.73 square meters)  •  Stage 3A Moderate CKD (GFR = 45-59 mL/min/1.73 square meters)  •  Stage 3B Moderate CKD (GFR = 30-44 mL/min/1.73 square meters)  •  Stage 4 Severe CKD (GFR = 15-29 mL/min/1.73 square meters)  •  Stage 5 End Stage CKD (GFR <15 mL/min/1.73 square meters)  Note: GFR calculation is accurate only with a steady state creatinine    CBC and differential [[id number]]  (Abnormal) Collected: 10/02/23 1446    Lab Status: Final result Specimen: Blood from Arm, Left Updated: 10/02/23 1451     WBC 8.52 Thousand/uL      RBC 4.77 Million/uL      Hemoglobin 14.5 g/dL      Hematocrit 42.5 %      MCV 89 fL      MCH 30.4 pg      MCHC 34.1 g/dL      RDW 12.8 %      MPV 8.7 fL      Platelets 972 Thousands/uL      nRBC 0 /100 WBCs      Neutrophils Relative 63 %      Immat GRANS % 1 %      Lymphocytes Relative 25 %      Monocytes Relative 8 %      Eosinophils Relative 2 %      Basophils Relative 1 %      Neutrophils Absolute 5.41 Thousands/µL      Immature Grans Absolute 0.04 Thousand/uL      Lymphocytes Absolute 2.15 Thousands/µL      Monocytes Absolute 0.70 Thousand/µL      Eosinophils Absolute 0.16 Thousand/µL      Basophils Absolute 0.06 Thousands/µL                  XR chest 1 view portable   ED Interpretation by Sun Mohan MD (10/02 1524)   No acute finding                 Procedures  ECG 12 Lead Documentation Only    Date/Time: 10/2/2023 2:40 PM    Performed by: Sun Mohan MD  Authorized by: Sun Mohan MD    ECG reviewed by me, the ED Provider: yes    Previous ECG:     Previous ECG:  Unavailable  Interpretation:     Interpretation: normal    Rate:     ECG rate:  85    ECG rate assessment: normal    Rhythm:     Rhythm: sinus rhythm    Ectopy:     Ectopy: none    QRS:     QRS axis:  Normal    QRS intervals:  Normal  Conduction:     Conduction: normal    ST segments:     ST segments:  Normal  T waves:     T waves: normal               ED Course                                             Medical Decision Making  Based on the history and medical screening exam performed the diagnostic considerations include but are not limited to influenza, RSV, COVID, pneumonia, viral URI, asthma, dyspnea, deconditioning.     Based on the work-up performed in the emergency room which includes physical examination, and which may include laboratory studies and imaging as warranted including advanced imaging such as CT scan or ultrasound, the differential diagnosis is narrowed to exclude limb or life-threatening process. The patient is stable for discharge. Lab work, chest x-ray, COVID testing all negative. EKG normal.    Amount and/or Complexity of Data Reviewed  Labs: ordered. Decision-making details documented in ED Course. Details: Normal  Radiology: ordered and independent interpretation performed. Decision-making details documented in ED Course. Details: Chest x-ray normal  ECG/medicine tests: ordered and independent interpretation performed. Decision-making details documented in ED Course. Details: Normal sinus rhythm rate 85          Disposition  Final diagnoses:   Dyspnea     Time reflects when diagnosis was documented in both MDM as applicable and the Disposition within this note     Time User Action Codes Description Comment    10/2/2023  3:58 PM Sun Mohan Add [R06.00] Dyspnea       ED Disposition     ED Disposition   Discharge    Condition   Stable    Date/Time   Mon Oct 2, 2023  3:58 PM    Comment   [place] Valiente discharge to home/self care. Follow-up Information     Follow up With Specialties Details Why [address] [facility] [address] [facility], [place] [facility] Medicine   [address]  [phone]            Patient's Medications   Discharge Prescriptions    No medications on file       No discharge procedures on file.     PDMP Review     None          ED Provider  Electronically Signed by           Sun Mohan MD  10/02/23 4158

## 2023-10-02 NOTE — ED NOTES
PT placed call on personal cell phone to [phone] [facility] to arrange transport. PT to finish breathing treatment and to d/c to waiting room to wait for ride.       Shira Bledsoe RN  10/02/23 5900

## 2023-11-06 ENCOUNTER — ANESTHESIA (OUTPATIENT)
Dept: GASTROENTEROLOGY | Facility: HOSPITAL | Age: 68
End: 2023-11-06

## 2023-11-06 ENCOUNTER — ANESTHESIA EVENT (OUTPATIENT)
Dept: GASTROENTEROLOGY | Facility: HOSPITAL | Age: 68
End: 2023-11-06

## 2023-11-06 ENCOUNTER — HOSPITAL ENCOUNTER (OUTPATIENT)
Dept: GASTROENTEROLOGY | Facility: HOSPITAL | Age: 68
Setting detail: OUTPATIENT SURGERY
Discharge: HOME | End: 2023-11-06
Attending: HOSPITALIST | Admitting: HOSPITALIST
Payer: COMMERCIAL

## 2023-11-06 VITALS
OXYGEN SATURATION: 98 % | DIASTOLIC BLOOD PRESSURE: 83 MMHG | RESPIRATION RATE: 20 BRPM | TEMPERATURE: 98 F | HEART RATE: 74 BPM | SYSTOLIC BLOOD PRESSURE: 137 MMHG

## 2023-11-06 DIAGNOSIS — K21.00 GASTROESOPHAGEAL REFLUX DISEASE WITH ESOPHAGITIS WITHOUT HEMORRHAGE: Primary | ICD-10-CM

## 2023-11-06 DIAGNOSIS — R10.13 EPIGASTRIC PAIN: ICD-10-CM

## 2023-11-06 PROBLEM — F99 PSYCHIATRIC DISORDER: Status: ACTIVE | Noted: 2023-11-06

## 2023-11-06 PROBLEM — K21.9 GERD (GASTROESOPHAGEAL REFLUX DISEASE): Status: ACTIVE | Noted: 2023-11-06

## 2023-11-06 PROBLEM — I10 HYPERTENSION: Status: ACTIVE | Noted: 2023-11-06

## 2023-11-06 PROBLEM — F41.9 ANXIETY: Status: ACTIVE | Noted: 2023-11-06

## 2023-11-06 PROBLEM — E78.5 HYPERLIPEMIA: Status: ACTIVE | Noted: 2023-11-06

## 2023-11-06 PROCEDURE — 88305 TISSUE EXAM BY PATHOLOGIST: CPT | Performed by: PATHOLOGY

## 2023-11-06 RX ORDER — SODIUM CHLORIDE, SODIUM LACTATE, POTASSIUM CHLORIDE, CALCIUM CHLORIDE 600; 310; 30; 20 MG/100ML; MG/100ML; MG/100ML; MG/100ML
INJECTION, SOLUTION INTRAVENOUS CONTINUOUS PRN
Status: DISCONTINUED | OUTPATIENT
Start: 2023-11-06 | End: 2023-11-06

## 2023-11-06 RX ORDER — LIDOCAINE HYDROCHLORIDE 10 MG/ML
INJECTION, SOLUTION EPIDURAL; INFILTRATION; INTRACAUDAL; PERINEURAL AS NEEDED
Status: DISCONTINUED | OUTPATIENT
Start: 2023-11-06 | End: 2023-11-06

## 2023-11-06 RX ORDER — PROPOFOL 10 MG/ML
INJECTION, EMULSION INTRAVENOUS AS NEEDED
Status: DISCONTINUED | OUTPATIENT
Start: 2023-11-06 | End: 2023-11-06

## 2023-11-06 RX ORDER — SUCRALFATE 1 G/1
1 TABLET ORAL 3 TIMES DAILY
Qty: 42 TABLET | Refills: 0 | Status: SHIPPED | OUTPATIENT
Start: 2023-11-06 | End: 2023-11-20

## 2023-11-06 RX ORDER — SUCRALFATE 1 G/1
1 TABLET ORAL 3 TIMES DAILY
Status: DISCONTINUED | OUTPATIENT
Start: 2023-11-06 | End: 2023-11-06

## 2023-11-06 RX ADMIN — PROPOFOL 120 MG: 10 INJECTION, EMULSION INTRAVENOUS at 13:34

## 2023-11-06 RX ADMIN — PROPOFOL 20 MG: 10 INJECTION, EMULSION INTRAVENOUS at 13:37

## 2023-11-06 RX ADMIN — LIDOCAINE HYDROCHLORIDE 50 MG: 10 INJECTION, SOLUTION EPIDURAL; INFILTRATION; INTRACAUDAL; PERINEURAL at 13:34

## 2023-11-06 RX ADMIN — SODIUM CHLORIDE, SODIUM LACTATE, POTASSIUM CHLORIDE, AND CALCIUM CHLORIDE: .6; .31; .03; .02 INJECTION, SOLUTION INTRAVENOUS at 13:30

## 2023-11-06 NOTE — ANESTHESIA PREPROCEDURE EVALUATION
Procedure:  EGD    Relevant Problems   ANESTHESIA (within normal limits)      CARDIO   (+) Hyperlipemia   (+) Hypertension      GI/HEPATIC   (+) GERD (gastroesophageal reflux disease)      NEURO/PSYCH   (+) Anxiety      Other   (+) Psychiatric disorder     Normal sinus rhythm  Borderline ECG  When compared with ECG of 01-SEP-2023 18:45,  No significant change was found  Confirmed by Rashmi Villanueva ([id number]) on 10/2/2023 4:45:43 PM     Physical Exam    Airway    Mallampati score: I  TM Distance: >3 FB  Neck ROM: full     Dental    upper dentures and lower dentures    Cardiovascular  Rhythm: regular, Rate: normal    Pulmonary   Breath sounds clear to auscultation    Other Findings        Anesthesia Plan  ASA Score- 2     Anesthesia Type- IV sedation with anesthesia with ASA Monitors. Additional Monitors:     Airway Plan:            Plan Factors-Exercise tolerance (METS): >4 METS. Chart reviewed. Patient summary reviewed. Patient is not a current smoker. Induction-     Postoperative Plan-     Informed Consent- Anesthetic plan and risks discussed with patient. I personally reviewed this patient with the CRNA. Discussed and agreed on the Anesthesia Plan with the CRNA. Yonathan Ivan

## 2023-11-06 NOTE — ANESTHESIA POSTPROCEDURE EVALUATION
Post-Op Assessment Note    CV Status:  Stable    Pain management: adequate     Mental Status:  Awake and alert   Hydration Status:  Stable   PONV Controlled:  None   Airway Patency:  Patent   Two or more mitigation strategies used for obstructive sleep apnea   Post Op Vitals Reviewed: Yes      Staff: CRNA         No notable events documented.     /61 (11/06/23 1345)    Temp 97.8 °F (36.6 °C) (11/06/23 1345)    Pulse 78 (11/06/23 1345)   Resp 22 (11/06/23 1345)    SpO2 98 % (11/06/23 1345)

## 2023-11-06 NOTE — H&P
Procedure(s):    Esophagogastroduodenuoscopy with the indication(s) of heartburn    Endoscopy Pre-Procedure Assessment:  Prior to the procedure, the patient is identified. The patient's history, medications, and allergies have been reviewed. The patient is competent. The risks and benefits of the procedure procedure and the planned sedation have been discussed with the patient. All questions have been answered and informed consent for the procedure has been obtained. Vitals:    11/06/23 1225   BP: 144/78   Pulse: 79   Resp: 22   Temp: 97.9 °F (36.6 °C)   SpO2: 97%       Physical Exam:  Physical Exam  HENT:      Nose: Nose normal.   Eyes:      Conjunctiva/sclera: Conjunctivae normal.   Cardiovascular:      Rate and Rhythm: Normal rate. Pulmonary:      Effort: Pulmonary effort is normal.   Abdominal:      Palpations: Abdomen is soft. Neurological:      General: No focal deficit present. Mental Status: He is alert. Psychiatric:         Mood and Affect: Mood normal.                Consent: We have discussed the procedure in detail. We reviewed risks, benefits and alternative as well as protentional complications including and not limited to medication side effect , infection, bleeding, perforation and the potential need for surgery, ICU admission, CPR, as well as the need for blood product transfusion. Patient verbalized understanding and agreement. All patient questions were answered. After reviewed the risks and benefits, the patient is deemed in satisfactory condition to undergo the procedure. The anesthesia plan is to use monitored anesthesia care (MAC).       11/06/23

## 2023-11-09 PROCEDURE — 88305 TISSUE EXAM BY PATHOLOGIST: CPT | Performed by: PATHOLOGY

## 2023-12-06 ENCOUNTER — OFFICE VISIT (OUTPATIENT)
Dept: URGENT CARE | Facility: MEDICAL CENTER | Age: 68
End: 2023-12-06
Payer: COMMERCIAL

## 2023-12-06 VITALS
WEIGHT: 160 LBS | RESPIRATION RATE: 20 BRPM | TEMPERATURE: 97.8 F | OXYGEN SATURATION: 97 % | HEART RATE: 94 BPM | DIASTOLIC BLOOD PRESSURE: 88 MMHG | SYSTOLIC BLOOD PRESSURE: 152 MMHG | BODY MASS INDEX: 27.46 KG/M2

## 2023-12-06 DIAGNOSIS — R05.1 ACUTE COUGH: Primary | ICD-10-CM

## 2023-12-06 LAB
SARS-COV-2 AG UPPER RESP QL IA: NEGATIVE
VALID CONTROL: NORMAL

## 2023-12-06 PROCEDURE — 99212 OFFICE O/P EST SF 10 MIN: CPT

## 2023-12-06 PROCEDURE — S9088 SERVICES PROVIDED IN URGENT: HCPCS

## 2023-12-06 PROCEDURE — 87811 SARS-COV-2 COVID19 W/OPTIC: CPT

## 2023-12-06 RX ORDER — BENZONATATE 100 MG/1
100 CAPSULE ORAL
Qty: 20 CAPSULE | Refills: 0 | Status: SHIPPED | OUTPATIENT
Start: 2023-12-06

## 2023-12-06 RX ORDER — GUAIFENESIN 600 MG/1
1200 TABLET, EXTENDED RELEASE ORAL EVERY 12 HOURS SCHEDULED
Qty: 30 TABLET | Refills: 0 | Status: SHIPPED | OUTPATIENT
Start: 2023-12-06

## 2023-12-06 NOTE — PATIENT INSTRUCTIONS
You may take over the counter Tylenol (Acetaminophen) as needed, as directed on packaging. Be sure to get plenty of rest, and drinking fluids to remain hydrated. Please follow up with your primary provider in the next several days. Should you have any worsening of symptoms, or lack of improvement please be re-evaluated. If needed for significant concerns, consider 911 or ER evaluation. The unnecessary use of antibiotics can have harmful affect, unwanted side-effects and can lead to antibiotic resistant bacteria in the future. You are being treated today for a viral illness. Viral illnesses do not require antibiotics, and are treated symptomatically. According to the Centers for Disease Control and Prevention, about one-third of antibiotic use in the outpatient setting, is not needed nor appropriate. Antibiotics treat infections caused by bacteria. But they don't treat infections caused by viruses (viral infections). For example, an antibiotic is the correct treatment for strep throat, which is caused by bacteria. But it's not the right treatment for most sore throats, which are caused by viruses. By being proactive and treating your individual symptoms, this may help you feel better.

## 2023-12-06 NOTE — PROGRESS NOTES
[facility]        NAME: Chirag Morfin is a 76 y.o. male  : 1955    MRN: [id number]  DATE: 2023  TIME: 11:10 AM    Assessment and Plan   Acute cough [R05.1]  1. Acute cough  Poct Covid 19 Rapid Antigen Test    guaiFENesin (MUCINEX) 600 mg 12 hr tablet    benzonatate (TESSALON PERLES) 100 mg capsule            Patient Instructions       Follow up with PCP in 3-5 days. Proceed to  ER if symptoms worsen. Chief Complaint     Chief Complaint   Patient presents with   • Cough     Cough x 2 weeks. Taking coricidin    • Nasal Congestion         History of Present Illness       Cough x 2 weeks. Taking Coricidin Q4hr. Cough is slightly productive. He has not been checking his temperature at home. Review of Systems   Review of Systems   Constitutional:  Negative for appetite change, chills, fatigue and fever. HENT:  Positive for congestion, rhinorrhea and sore throat. Negative for ear pain, sinus pressure, sinus pain and trouble swallowing. Respiratory:  Positive for cough. Negative for shortness of breath. Cardiovascular:  Negative for chest pain and palpitations. Gastrointestinal:  Positive for nausea. Negative for abdominal pain, constipation, diarrhea and vomiting. Musculoskeletal:  Negative for arthralgias and back pain. Skin:  Negative for color change and rash. Neurological:  Negative for dizziness, light-headedness and headaches. All other systems reviewed and are negative.         Current Medications       Current Outpatient Medications:   •  albuterol (PROVENTIL HFA,VENTOLIN HFA) 90 mcg/act inhaler, 2-3 inhalations every 3-4 hours for 3 days and then as needed for cough, wheezing, Disp: 18 g, Rfl: 0  •  aspirin (ECOTRIN LOW STRENGTH) 81 mg EC tablet, Take by mouth, Disp: , Rfl:   •  benzonatate (TESSALON PERLES) 100 mg capsule, Take 1 capsule (100 mg total) by mouth daily at bedtime as needed for cough, Disp: 20 capsule, Rfl: 0  •  docusate sodium (COLACE) 100 mg capsule, Take by mouth, Disp: , Rfl:   •  famotidine (PEPCID) 20 mg tablet, Take 20 mg by mouth 2 (two) times a day, Disp: , Rfl:   •  fluticasone (FLONASE) 50 mcg/act nasal spray, 2 sprays by Each Nare route daily, Disp: , Rfl:   •  guaiFENesin (MUCINEX) 600 mg 12 hr tablet, Take 2 tablets (1,200 mg total) by mouth every 12 (twelve) hours, Disp: 30 tablet, Rfl: 0  •  hydrochlorothiazide (MICROZIDE) 12.5 mg capsule, Take 1 capsule by mouth daily, Disp: , Rfl:   •  montelukast (SINGULAIR) 10 mg tablet, Take 10 mg by mouth daily, Disp: , Rfl:   •  multivitamin (THERAGRAN) TABS, Take 1 tablet by mouth daily, Disp: , Rfl:   •  Omega-3 Fatty Acids (fish oil) 1,000 mg, Take by mouth, Disp: , Rfl:   •  pantoprazole (PROTONIX) 40 mg tablet, Take by mouth, Disp: , Rfl:   •  simvastatin (ZOCOR) 40 mg tablet, Take 1 tablet by mouth daily, Disp: , Rfl:   •  topiramate (TOPAMAX) 50 MG tablet, Take 50 mg by mouth 2 (two) times a day, Disp: , Rfl:   •  ziprasidone (GEODON) 80 mg capsule, Take 1 capsule by mouth daily at bedtime, Disp: , Rfl:   •  sucralfate (CARAFATE) 1 g tablet, Take 1 tablet (1 g total) by mouth 3 (three) times a day for 14 days, Disp: 42 tablet, Rfl: 0    Current Allergies     Allergies as of 12/06/2023 - Reviewed 12/06/2023   Allergen Reaction Noted   • Penicillins Other (See Comments) 09/01/2022            The following portions of the patient's history were reviewed and updated as appropriate: allergies, current medications, past family history, past medical history, past social history, past surgical history and problem list.     Past Medical History:   Diagnosis Date   • Anxiety    • GERD (gastroesophageal reflux disease)    • Hyperlipemia    • Hypertension    • Psychiatric disorder        Past Surgical History:   Procedure Laterality Date   • CHOLECYSTECTOMY     • HERNIA REPAIR         History reviewed. No pertinent family history. Medications have been verified.         Objective   /88 Pulse 94   Temp 97.8 °F (36.6 °C)   Resp 20   Wt 72.6 kg (160 lb)   SpO2 97%   BMI 27.46 kg/m²        Physical Exam     Physical Exam  Vitals and nursing note reviewed. Constitutional:       General: He is awake. He is not in acute distress. Appearance: Normal appearance. He is well-developed, well-groomed and normal weight. He is not ill-appearing. HENT:      Head: Normocephalic and atraumatic. Right Ear: Tympanic membrane, ear canal and external ear normal. Tympanic membrane is not erythematous or bulging. Left Ear: Tympanic membrane, ear canal and external ear normal. Tympanic membrane is not erythematous or bulging. Nose: Congestion and rhinorrhea present. Rhinorrhea is clear. Mouth/Throat:      Lips: Pink. Mouth: Mucous membranes are moist.      Pharynx: Oropharynx is clear. Uvula midline. No pharyngeal swelling, oropharyngeal exudate, posterior oropharyngeal erythema or uvula swelling. Tonsils: No tonsillar exudate or tonsillar abscesses. 0 on the right. 0 on the left. Eyes:      Extraocular Movements: Extraocular movements intact. Conjunctiva/sclera: Conjunctivae normal.      Pupils: Pupils are equal, round, and reactive to light. Cardiovascular:      Rate and Rhythm: Normal rate and regular rhythm. Pulses: Normal pulses. Heart sounds: Normal heart sounds. Pulmonary:      Effort: Pulmonary effort is normal.      Breath sounds: Normal breath sounds. No decreased breath sounds, wheezing, rhonchi or rales. Abdominal:      General: Abdomen is flat. Bowel sounds are normal.      Palpations: Abdomen is soft. Musculoskeletal:         General: Normal range of motion. Cervical back: Full passive range of motion without pain, normal range of motion and neck supple. Lymphadenopathy:      Cervical: No cervical adenopathy. Skin:     General: Skin is warm and dry. Capillary Refill: Capillary refill takes less than 2 seconds.    Neurological: General: No focal deficit present. Mental Status: He is alert and oriented to person, place, and time. Psychiatric:         Mood and Affect: Mood normal.         Behavior: Behavior normal. Behavior is cooperative.

## 2023-12-18 ENCOUNTER — LAB (OUTPATIENT)
Dept: LAB | Facility: HOSPITAL | Age: 68
End: 2023-12-18
Payer: COMMERCIAL

## 2023-12-18 ENCOUNTER — APPOINTMENT (OUTPATIENT)
Dept: LAB | Facility: HOSPITAL | Age: 68
End: 2023-12-18
Payer: COMMERCIAL

## 2023-12-18 DIAGNOSIS — Z79.899 ENCOUNTER FOR LONG-TERM (CURRENT) USE OF OTHER MEDICATIONS: ICD-10-CM

## 2023-12-18 LAB
ATRIAL RATE: 81 BPM
CHOLEST SERPL-MCNC: 207 MG/DL
EST. AVERAGE GLUCOSE BLD GHB EST-MCNC: 128 MG/DL
HBA1C MFR BLD: 6.1 %
HDLC SERPL-MCNC: 68 MG/DL
LDLC SERPL CALC-MCNC: 118 MG/DL (ref 0–100)
NONHDLC SERPL-MCNC: 139 MG/DL
P AXIS: 56 DEGREES
PR INTERVAL: 142 MS
QRS AXIS: 26 DEGREES
QRSD INTERVAL: 72 MS
QT INTERVAL: 368 MS
QTC INTERVAL: 427 MS
T WAVE AXIS: 1 DEGREES
TRIGL SERPL-MCNC: 106 MG/DL
VENTRICULAR RATE: 81 BPM

## 2023-12-18 PROCEDURE — 80061 LIPID PANEL: CPT

## 2023-12-18 PROCEDURE — 93005 ELECTROCARDIOGRAM TRACING: CPT

## 2023-12-18 PROCEDURE — 36415 COLL VENOUS BLD VENIPUNCTURE: CPT

## 2023-12-18 PROCEDURE — 83036 HEMOGLOBIN GLYCOSYLATED A1C: CPT

## 2024-01-16 ENCOUNTER — APPOINTMENT (EMERGENCY)
Dept: CT IMAGING | Facility: HOSPITAL | Age: 69
End: 2024-01-16
Payer: COMMERCIAL

## 2024-01-16 ENCOUNTER — HOSPITAL ENCOUNTER (EMERGENCY)
Facility: HOSPITAL | Age: 69
Discharge: HOME | End: 2024-01-16
Attending: EMERGENCY MEDICINE
Payer: COMMERCIAL

## 2024-01-16 VITALS
RESPIRATION RATE: 18 BRPM | BODY MASS INDEX: 28.53 KG/M2 | SYSTOLIC BLOOD PRESSURE: 164 MMHG | OXYGEN SATURATION: 98 % | DIASTOLIC BLOOD PRESSURE: 82 MMHG | HEART RATE: 88 BPM | WEIGHT: 166.23 LBS | TEMPERATURE: 97.9 F

## 2024-01-16 DIAGNOSIS — R10.9 RIGHT FLANK PAIN: Primary | ICD-10-CM

## 2024-01-16 DIAGNOSIS — Q53.112 UNILATERAL INGUINAL TESTIS: ICD-10-CM

## 2024-01-16 LAB
ALBUMIN SERPL BCP-MCNC: 4.6 G/DL (ref 3.5–5)
ALP SERPL-CCNC: 50 U/L (ref 34–104)
ALT SERPL W P-5'-P-CCNC: 27 U/L (ref 7–52)
ANION GAP SERPL CALCULATED.3IONS-SCNC: 8 MMOL/L
AST SERPL W P-5'-P-CCNC: 27 U/L (ref 13–39)
BASOPHILS # BLD AUTO: 0.06 THOUSANDS/ÂΜL (ref 0–0.1)
BASOPHILS NFR BLD AUTO: 1 % (ref 0–1)
BILIRUB SERPL-MCNC: 0.32 MG/DL (ref 0.2–1)
BILIRUB UR QL STRIP: NEGATIVE
BUN SERPL-MCNC: 13 MG/DL (ref 5–25)
CALCIUM SERPL-MCNC: 9.2 MG/DL (ref 8.4–10.2)
CHLORIDE SERPL-SCNC: 103 MMOL/L (ref 96–108)
CLARITY UR: NORMAL
CO2 SERPL-SCNC: 24 MMOL/L (ref 21–32)
COLOR UR: YELLOW
CREAT SERPL-MCNC: 0.88 MG/DL (ref 0.6–1.3)
EOSINOPHIL # BLD AUTO: 0.14 THOUSAND/ÂΜL (ref 0–0.61)
EOSINOPHIL NFR BLD AUTO: 2 % (ref 0–6)
ERYTHROCYTE [DISTWIDTH] IN BLOOD BY AUTOMATED COUNT: 13.2 % (ref 11.6–15.1)
GFR SERPL CREATININE-BSD FRML MDRD: 88 ML/MIN/1.73SQ M
GLUCOSE SERPL-MCNC: 110 MG/DL (ref 65–140)
GLUCOSE UR STRIP-MCNC: NEGATIVE MG/DL
HCT VFR BLD AUTO: 41.9 % (ref 36.5–49.3)
HGB BLD-MCNC: 14.4 G/DL (ref 12–17)
HGB UR QL STRIP.AUTO: NEGATIVE
IMM GRANULOCYTES # BLD AUTO: 0.03 THOUSAND/UL (ref 0–0.2)
IMM GRANULOCYTES NFR BLD AUTO: 1 % (ref 0–2)
KETONES UR STRIP-MCNC: NEGATIVE MG/DL
LEUKOCYTE ESTERASE UR QL STRIP: NEGATIVE
LYMPHOCYTES # BLD AUTO: 1.97 THOUSANDS/ÂΜL (ref 0.6–4.47)
LYMPHOCYTES NFR BLD AUTO: 30 % (ref 14–44)
MCH RBC QN AUTO: 29.5 PG (ref 26.8–34.3)
MCHC RBC AUTO-ENTMCNC: 34.4 G/DL (ref 31.4–37.4)
MCV RBC AUTO: 86 FL (ref 82–98)
MONOCYTES # BLD AUTO: 0.58 THOUSAND/ÂΜL (ref 0.17–1.22)
MONOCYTES NFR BLD AUTO: 9 % (ref 4–12)
NEUTROPHILS # BLD AUTO: 3.88 THOUSANDS/ÂΜL (ref 1.85–7.62)
NEUTS SEG NFR BLD AUTO: 57 % (ref 43–75)
NITRITE UR QL STRIP: NEGATIVE
NRBC BLD AUTO-RTO: 0 /100 WBCS
PH UR STRIP.AUTO: 7.5 [PH]
PLATELET # BLD AUTO: 204 THOUSANDS/UL (ref 149–390)
PMV BLD AUTO: 8.5 FL (ref 8.9–12.7)
POTASSIUM SERPL-SCNC: 3.6 MMOL/L (ref 3.5–5.3)
PROT SERPL-MCNC: 7.2 G/DL (ref 6.4–8.4)
PROT UR STRIP-MCNC: NEGATIVE MG/DL
RBC # BLD AUTO: 4.88 MILLION/UL (ref 3.88–5.62)
SODIUM SERPL-SCNC: 135 MMOL/L (ref 135–147)
SP GR UR STRIP.AUTO: 1.01 (ref 1–1.03)
UROBILINOGEN UR QL STRIP.AUTO: 0.2 E.U./DL
WBC # BLD AUTO: 6.66 THOUSAND/UL (ref 4.31–10.16)

## 2024-01-16 PROCEDURE — 81003 URINALYSIS AUTO W/O SCOPE: CPT | Performed by: EMERGENCY MEDICINE

## 2024-01-16 PROCEDURE — 80053 COMPREHEN METABOLIC PANEL: CPT | Performed by: EMERGENCY MEDICINE

## 2024-01-16 PROCEDURE — 99284 EMERGENCY DEPT VISIT MOD MDM: CPT

## 2024-01-16 PROCEDURE — 85025 COMPLETE CBC W/AUTO DIFF WBC: CPT | Performed by: EMERGENCY MEDICINE

## 2024-01-16 PROCEDURE — 74176 CT ABD & PELVIS W/O CONTRAST: CPT

## 2024-01-16 PROCEDURE — G1004 CDSM NDSC: HCPCS

## 2024-01-16 PROCEDURE — 99284 EMERGENCY DEPT VISIT MOD MDM: CPT | Performed by: EMERGENCY MEDICINE

## 2024-01-16 PROCEDURE — 36415 COLL VENOUS BLD VENIPUNCTURE: CPT | Performed by: EMERGENCY MEDICINE

## 2024-01-17 NOTE — ED PROVIDER NOTES
History  Chief Complaint   Patient presents with    Flank Pain     Right sided flank pain for 6 hours, feels like a dull ache.        History provided by:  Medical records and patient  Flank Pain  Pain location:  R flank  Pain quality: aching and dull    Pain radiates to:  Does not radiate  Pain severity:  Mild  Onset quality:  Sudden  Duration:  7 hours  Timing:  Constant  Progression:  Waxing and waning  Chronicity:  New  Context comment:  Hx of lumbar disc disease, states he developed sudden onset of right flank pain nonradiating 7 hours prior to arrival, no nausea or vomiting  Relieved by:  Nothing  Worsened by:  Nothing  Ineffective treatments:  None tried  Associated symptoms: no chills, no cough, no fatigue, no hematuria, no nausea, no shortness of breath, no sore throat and no vomiting        Prior to Admission Medications   Prescriptions Last Dose Informant Patient Reported? Taking?   Omega-3 Fatty Acids (fish oil) 1,000 mg   Yes No   Sig: Take by mouth   albuterol (PROVENTIL HFA,VENTOLIN HFA) 90 mcg/act inhaler   No No   Si-3 inhalations every 3-4 hours for 3 days and then as needed for cough, wheezing   aspirin (ECOTRIN LOW STRENGTH) 81 mg EC tablet   Yes No   Sig: Take by mouth   benzonatate (TESSALON PERLES) 100 mg capsule   No No   Sig: Take 1 capsule (100 mg total) by mouth daily at bedtime as needed for cough   docusate sodium (COLACE) 100 mg capsule   Yes No   Sig: Take by mouth   famotidine (PEPCID) 20 mg tablet   Yes No   Sig: Take 20 mg by mouth 2 (two) times a day   fluticasone (FLONASE) 50 mcg/act nasal spray   Yes No   Si sprays by Each Nare route daily   guaiFENesin (MUCINEX) 600 mg 12 hr tablet   No No   Sig: Take 2 tablets (1,200 mg total) by mouth every 12 (twelve) hours   hydrochlorothiazide (MICROZIDE) 12.5 mg capsule   Yes No   Sig: Take 1 capsule by mouth daily   montelukast (SINGULAIR) 10 mg tablet   Yes No   Sig: Take 10 mg by mouth daily   multivitamin (THERAGRAN) TABS   Yes  No   Sig: Take 1 tablet by mouth daily   pantoprazole (PROTONIX) 40 mg tablet   Yes No   Sig: Take by mouth   simvastatin (ZOCOR) 40 mg tablet   Yes No   Sig: Take 1 tablet by mouth daily   sucralfate (CARAFATE) 1 g tablet   No No   Sig: Take 1 tablet (1 g total) by mouth 3 (three) times a day for 14 days   topiramate (TOPAMAX) 50 MG tablet   Yes No   Sig: Take 50 mg by mouth 2 (two) times a day   ziprasidone (GEODON) 80 mg capsule   Yes No   Sig: Take 1 capsule by mouth daily at bedtime      Facility-Administered Medications: None       Past Medical History:   Diagnosis Date    Anxiety     GERD (gastroesophageal reflux disease)     Hyperlipemia     Hypertension     Psychiatric disorder        Past Surgical History:   Procedure Laterality Date    CHOLECYSTECTOMY      HERNIA REPAIR         History reviewed. No pertinent family history.  I have reviewed and agree with the history as documented.    E-Cigarette/Vaping    E-Cigarette Use Never User      E-Cigarette/Vaping Substances    Nicotine No     THC No     CBD No     Flavoring No      Social History     Tobacco Use    Smoking status: Former     Types: Cigarettes     Passive exposure: Never    Smokeless tobacco: Never   Vaping Use    Vaping status: Never Used   Substance Use Topics    Alcohol use: Yes     Comment: rare    Drug use: Not Currently       Review of Systems   Constitutional:  Negative for appetite change, chills and fatigue.   HENT:  Negative for ear pain, rhinorrhea, sore throat and trouble swallowing.    Eyes:  Negative for pain, discharge and visual disturbance.   Respiratory:  Negative for cough, chest tightness and shortness of breath.    Cardiovascular:  Negative for palpitations.   Gastrointestinal:  Negative for nausea and vomiting.   Endocrine: Negative for polydipsia, polyphagia and polyuria.   Genitourinary:  Positive for flank pain. Negative for difficulty urinating, hematuria and testicular pain.   Skin:  Negative for color change and rash.    Allergic/Immunologic: Negative for immunocompromised state.   Neurological:  Negative for dizziness, seizures and syncope.   Hematological:  Negative for adenopathy.   Psychiatric/Behavioral:  Negative for confusion and dysphoric mood.    All other systems reviewed and are negative.      Physical Exam  Physical Exam  Vitals and nursing note reviewed.   Constitutional:       General: He is not in acute distress.     Appearance: Normal appearance. He is not ill-appearing, toxic-appearing or diaphoretic.   HENT:      Head: Normocephalic and atraumatic.      Nose: Nose normal. No congestion or rhinorrhea.      Mouth/Throat:      Mouth: Mucous membranes are moist.      Pharynx: Oropharynx is clear. No oropharyngeal exudate or posterior oropharyngeal erythema.   Eyes:      General:         Right eye: No discharge.         Left eye: No discharge.   Cardiovascular:      Rate and Rhythm: Normal rate and regular rhythm.      Pulses: Normal pulses.      Heart sounds: Normal heart sounds. No murmur heard.     No gallop.   Pulmonary:      Effort: Pulmonary effort is normal. No respiratory distress.      Breath sounds: Normal breath sounds. No stridor. No wheezing, rhonchi or rales.   Chest:      Chest wall: No tenderness.   Abdominal:      General: Bowel sounds are normal. There is no distension.      Palpations: Abdomen is soft. There is no mass.      Tenderness: There is no abdominal tenderness. There is no right CVA tenderness, left CVA tenderness, guarding or rebound.      Hernia: No hernia is present.   Musculoskeletal:         General: Normal range of motion.      Cervical back: Normal range of motion and neck supple.   Skin:     General: Skin is warm and dry.      Capillary Refill: Capillary refill takes less than 2 seconds.   Neurological:      General: No focal deficit present.      Mental Status: He is alert and oriented to person, place, and time.      Cranial Nerves: No cranial nerve deficit.      Sensory: No  sensory deficit.      Motor: No weakness.      Coordination: Coordination normal.      Gait: Gait normal.      Deep Tendon Reflexes: Reflexes normal.   Psychiatric:         Mood and Affect: Mood normal.         Behavior: Behavior normal.         Thought Content: Thought content normal.         Judgment: Judgment normal.         Vital Signs  ED Triage Vitals [01/16/24 1917]   Temperature Pulse Respirations Blood Pressure SpO2   97.9 °F (36.6 °C) 88 18 164/82 98 %      Temp Source Heart Rate Source Patient Position - Orthostatic VS BP Location FiO2 (%)   Temporal Monitor Sitting Left arm --      Pain Score       --           Vitals:    01/16/24 1917   BP: 164/82   Pulse: 88   Patient Position - Orthostatic VS: Sitting         Visual Acuity      ED Medications  Medications - No data to display    Diagnostic Studies  Results Reviewed       Procedure Component Value Units Date/Time    UA (URINE) with reflex to Scope [[id number]] Collected: 01/16/24 1932    Lab Status: Final result Specimen: Urine, Clean Catch Updated: 01/16/24 2005     Color, UA Yellow     Clarity, UA Slightly Cloudy     Specific Gravity, UA 1.015     pH, UA 7.5     Leukocytes, UA Negative     Nitrite, UA Negative     Protein, UA Negative mg/dl      Glucose, UA Negative mg/dl      Ketones, UA Negative mg/dl      Urobilinogen, UA 0.2 E.U./dl      Bilirubin, UA Negative     Occult Blood, UA Negative    Comprehensive metabolic panel [[id number]] Collected: 01/16/24 1932    Lab Status: Final result Specimen: Blood from Arm, Left Updated: 01/16/24 2003     Sodium 135 mmol/L      Potassium 3.6 mmol/L      Chloride 103 mmol/L      CO2 24 mmol/L      ANION GAP 8 mmol/L      BUN 13 mg/dL      Creatinine 0.88 mg/dL      Glucose 110 mg/dL      Calcium 9.2 mg/dL      AST 27 U/L      ALT 27 U/L      Alkaline Phosphatase 50 U/L      Total Protein 7.2 g/dL      Albumin 4.6 g/dL      Total Bilirubin 0.32 mg/dL      eGFR 88 ml/min/1.73sq m     Narrative:      National  Kidney Disease Foundation guidelines for Chronic Kidney Disease (CKD):     Stage 1 with normal or high GFR (GFR > 90 mL/min/1.73 square meters)    Stage 2 Mild CKD (GFR = 60-89 mL/min/1.73 square meters)    Stage 3A Moderate CKD (GFR = 45-59 mL/min/1.73 square meters)    Stage 3B Moderate CKD (GFR = 30-44 mL/min/1.73 square meters)    Stage 4 Severe CKD (GFR = 15-29 mL/min/1.73 square meters)    Stage 5 End Stage CKD (GFR <15 mL/min/1.73 square meters)  Note: GFR calculation is accurate only with a steady state creatinine    CBC and differential [[id number]]  (Abnormal) Collected: 01/16/24 1932    Lab Status: Final result Specimen: Blood from Arm, Left Updated: 01/16/24 1940     WBC 6.66 Thousand/uL      RBC 4.88 Million/uL      Hemoglobin 14.4 g/dL      Hematocrit 41.9 %      MCV 86 fL      MCH 29.5 pg      MCHC 34.4 g/dL      RDW 13.2 %      MPV 8.5 fL      Platelets 204 Thousands/uL      nRBC 0 /100 WBCs      Neutrophils Relative 57 %      Immat GRANS % 1 %      Lymphocytes Relative 30 %      Monocytes Relative 9 %      Eosinophils Relative 2 %      Basophils Relative 1 %      Neutrophils Absolute 3.88 Thousands/µL      Immature Grans Absolute 0.03 Thousand/uL      Lymphocytes Absolute 1.97 Thousands/µL      Monocytes Absolute 0.58 Thousand/µL      Eosinophils Absolute 0.14 Thousand/µL      Basophils Absolute 0.06 Thousands/µL                    CT renal stone study abdomen pelvis wo contrast   Final Result by Deann Khanna MD (01/16 2035)      1) No acute abdominal or pelvic pathology within limitation of a noncontrast study.      2) Two right-sided intrarenal calculi measuring up to 4 mm. No ureteral or urinary bladder calculi. No hydronephrosis or perinephric stranding.      3) Ovoid density in the right inguinal canal as on the prior studies, likely representing the right testicle versus less likely postsurgical change. Given position of the right testicle within the inguinal canal on at least 3 studies,  this may represent    an undescended testicle. Recommend nonemergent urology consultation.      4) Additional findings as above.      The study was marked in EPIC for immediate notification.         Workstation performed: [id number]                    Procedures  Procedures         ED Course                               SBIRT 22yo+      Flowsheet Row Most Recent Value   Initial Alcohol Screen: US AUDIT-C     1. How often do you have a drink containing alcohol? 0 Filed at: 01/16/2024 1918   2. How many drinks containing alcohol do you have on a typical day you are drinking?  0 Filed at: 01/16/2024 1918   3a. Male UNDER 65: How often do you have five or more drinks on one occasion? 0 Filed at: 01/16/2024 1918   3b. FEMALE Any Age, or MALE 65+: How often do you have 4 or more drinks on one occassion? 0 Filed at: 01/16/2024 1918   Audit-C Score 0 Filed at: 01/16/2024 1918   SERGIO: How many times in the past year have you...    Used an illegal drug or used a prescription medication for non-medical reasons? Never Filed at: 01/16/2024 1918                      Medical Decision Making  1917: Patient appears well, vital signs reviewed.  Sudden onset of right flank pain started 7 hours prior to arrival.  No radiation of pain.  Likely muscle skeletal in nature however given age, sudden onset, plan to complete CT abdomen pelvis, basic labs and urinalysis.    2045: CT and labs reviewed.  Pain well-controlled.  Findings on CT consistent with undescended right testicle, patient states this is a chronic condition.  Urology referral given.    Amount and/or Complexity of Data Reviewed  Labs: ordered.  Radiology: ordered and independent interpretation performed.     Details: CT abdomen pelvis no acute pathology, high riding right testicle.  Bilateral intrarenal calculi             Disposition  Final diagnoses:   Right flank pain   Unilateral inguinal testis     Time reflects when diagnosis was documented in both MDM as applicable and  the Disposition within this note       Time User Action Codes Description Comment    1/16/2024  8:41 PM Herson Henriquez Add [R10.9] Right flank pain     1/16/2024  8:41 PM Herson Henriquez Add [Q53.112] Unilateral inguinal testis           ED Disposition       ED Disposition   Discharge    Condition   Stable    Date/Time   Tue Jan 16, 2024 2041    Comment   Robin Mckeoner discharge to home/self care.                   Follow-up Information       Follow up With Specialties Details Why Contact Info Additional Information    [facility] Schedule an appointment as soon as possible for a visit   [address]  [address] [facility] [address] [phone] [facility] [address], [address], [place], [address] [phone]            Discharge Medication List as of 1/16/2024  8:41 PM        CONTINUE these medications which have NOT CHANGED    Details   albuterol (PROVENTIL HFA,VENTOLIN HFA) 90 mcg/act inhaler 2-3 inhalations every 3-4 hours for 3 days and then as needed for cough, wheezing, Normal      aspirin (ECOTRIN LOW STRENGTH) 81 mg EC tablet Take by mouth, Historical Med      benzonatate (TESSALON PERLES) 100 mg capsule Take 1 capsule (100 mg total) by mouth daily at bedtime as needed for cough, Starting Wed 12/6/2023, Normal      docusate sodium (COLACE) 100 mg capsule Take by mouth, Historical Med      famotidine (PEPCID) 20 mg tablet Take 20 mg by mouth 2 (two) times a day, Starting Tue 8/2/2022, Historical Med      fluticasone (FLONASE) 50 mcg/act nasal spray 2 sprays by Each Nare route daily, Starting Wed 7/27/2022, Historical Med      guaiFENesin (MUCINEX) 600 mg 12 hr tablet Take 2 tablets (1,200 mg total) by mouth every 12 (twelve) hours, Starting Wed 12/6/2023, Normal      hydrochlorothiazide (MICROZIDE) 12.5 mg capsule Take 1 capsule by mouth daily, Starting Sun 6/5/2022,  Historical Med      montelukast (SINGULAIR) 10 mg tablet Take 10 mg by mouth daily, Starting Tue 9/26/2023, Historical Med      multivitamin (THERAGRAN) TABS Take 1 tablet by mouth daily, Historical Med      Omega-3 Fatty Acids (fish oil) 1,000 mg Take by mouth, Historical Med      pantoprazole (PROTONIX) 40 mg tablet Take by mouth, Historical Med      simvastatin (ZOCOR) 40 mg tablet Take 1 tablet by mouth daily, Starting Sun 6/5/2022, Historical Med      sucralfate (CARAFATE) 1 g tablet Take 1 tablet (1 g total) by mouth 3 (three) times a day for 14 days, Starting Mon 11/6/2023, Until Mon 11/20/2023, Normal      topiramate (TOPAMAX) 50 MG tablet Take 50 mg by mouth 2 (two) times a day, Historical Med      ziprasidone (GEODON) 80 mg capsule Take 1 capsule by mouth daily at bedtime, Starting Mon 5/9/2022, Historical Med             No discharge procedures on file.    PDMP Review       None            ED Provider  Electronically Signed by             Herson Henriquez MD  01/16/24 3428

## 2024-05-30 ENCOUNTER — APPOINTMENT (EMERGENCY)
Dept: RADIOLOGY | Facility: HOSPITAL | Age: 69
End: 2024-05-30
Payer: COMMERCIAL

## 2024-05-30 ENCOUNTER — HOSPITAL ENCOUNTER (EMERGENCY)
Facility: HOSPITAL | Age: 69
Discharge: HOME | End: 2024-05-30
Attending: EMERGENCY MEDICINE
Payer: COMMERCIAL

## 2024-05-30 VITALS
BODY MASS INDEX: 27.81 KG/M2 | WEIGHT: 162.04 LBS | DIASTOLIC BLOOD PRESSURE: 71 MMHG | OXYGEN SATURATION: 97 % | HEART RATE: 77 BPM | TEMPERATURE: 97.9 F | SYSTOLIC BLOOD PRESSURE: 130 MMHG | RESPIRATION RATE: 22 BRPM

## 2024-05-30 DIAGNOSIS — E87.6 HYPOKALEMIA: ICD-10-CM

## 2024-05-30 DIAGNOSIS — R05.1 ACUTE COUGH: Primary | ICD-10-CM

## 2024-05-30 LAB
ALBUMIN SERPL BCP-MCNC: 4.3 G/DL (ref 3.5–5)
ALP SERPL-CCNC: 49 U/L (ref 34–104)
ALT SERPL W P-5'-P-CCNC: 29 U/L (ref 7–52)
ANION GAP SERPL CALCULATED.3IONS-SCNC: 9 MMOL/L (ref 4–13)
AST SERPL W P-5'-P-CCNC: 29 U/L (ref 13–39)
BASOPHILS # BLD AUTO: 0.06 THOUSANDS/ÂΜL (ref 0–0.1)
BASOPHILS NFR BLD AUTO: 1 % (ref 0–1)
BILIRUB SERPL-MCNC: 0.35 MG/DL (ref 0.2–1)
BNP SERPL-MCNC: 70 PG/ML (ref 0–100)
BUN SERPL-MCNC: 24 MG/DL (ref 5–25)
CALCIUM SERPL-MCNC: 8.8 MG/DL (ref 8.4–10.2)
CHLORIDE SERPL-SCNC: 102 MMOL/L (ref 96–108)
CO2 SERPL-SCNC: 24 MMOL/L (ref 21–32)
CREAT SERPL-MCNC: 0.93 MG/DL (ref 0.6–1.3)
EOSINOPHIL # BLD AUTO: 0.26 THOUSAND/ÂΜL (ref 0–0.61)
EOSINOPHIL NFR BLD AUTO: 3 % (ref 0–6)
ERYTHROCYTE [DISTWIDTH] IN BLOOD BY AUTOMATED COUNT: 13.2 % (ref 11.6–15.1)
GFR SERPL CREATININE-BSD FRML MDRD: 84 ML/MIN/1.73SQ M
GLUCOSE SERPL-MCNC: 101 MG/DL (ref 65–140)
HCT VFR BLD AUTO: 39.6 % (ref 36.5–49.3)
HGB BLD-MCNC: 13.5 G/DL (ref 12–17)
IMM GRANULOCYTES # BLD AUTO: 0.03 THOUSAND/UL (ref 0–0.2)
IMM GRANULOCYTES NFR BLD AUTO: 0 % (ref 0–2)
LYMPHOCYTES # BLD AUTO: 2.27 THOUSANDS/ÂΜL (ref 0.6–4.47)
LYMPHOCYTES NFR BLD AUTO: 29 % (ref 14–44)
MAGNESIUM SERPL-MCNC: 2.1 MG/DL (ref 1.9–2.7)
MCH RBC QN AUTO: 29.2 PG (ref 26.8–34.3)
MCHC RBC AUTO-ENTMCNC: 34.1 G/DL (ref 31.4–37.4)
MCV RBC AUTO: 86 FL (ref 82–98)
MONOCYTES # BLD AUTO: 0.69 THOUSAND/ÂΜL (ref 0.17–1.22)
MONOCYTES NFR BLD AUTO: 9 % (ref 4–12)
NEUTROPHILS # BLD AUTO: 4.4 THOUSANDS/ÂΜL (ref 1.85–7.62)
NEUTS SEG NFR BLD AUTO: 58 % (ref 43–75)
NRBC BLD AUTO-RTO: 0 /100 WBCS
PLATELET # BLD AUTO: 209 THOUSANDS/UL (ref 149–390)
PMV BLD AUTO: 8.7 FL (ref 8.9–12.7)
POTASSIUM SERPL-SCNC: 3.2 MMOL/L (ref 3.5–5.3)
PROT SERPL-MCNC: 6.8 G/DL (ref 6.4–8.4)
RBC # BLD AUTO: 4.62 MILLION/UL (ref 3.88–5.62)
SODIUM SERPL-SCNC: 135 MMOL/L (ref 135–147)
WBC # BLD AUTO: 7.71 THOUSAND/UL (ref 4.31–10.16)

## 2024-05-30 PROCEDURE — 71046 X-RAY EXAM CHEST 2 VIEWS: CPT

## 2024-05-30 PROCEDURE — 83880 ASSAY OF NATRIURETIC PEPTIDE: CPT | Performed by: EMERGENCY MEDICINE

## 2024-05-30 PROCEDURE — 80053 COMPREHEN METABOLIC PANEL: CPT | Performed by: EMERGENCY MEDICINE

## 2024-05-30 PROCEDURE — 36415 COLL VENOUS BLD VENIPUNCTURE: CPT | Performed by: EMERGENCY MEDICINE

## 2024-05-30 PROCEDURE — 99283 EMERGENCY DEPT VISIT LOW MDM: CPT

## 2024-05-30 PROCEDURE — 99284 EMERGENCY DEPT VISIT MOD MDM: CPT | Performed by: EMERGENCY MEDICINE

## 2024-05-30 PROCEDURE — 83735 ASSAY OF MAGNESIUM: CPT | Performed by: EMERGENCY MEDICINE

## 2024-05-30 PROCEDURE — 85025 COMPLETE CBC W/AUTO DIFF WBC: CPT | Performed by: EMERGENCY MEDICINE

## 2024-05-30 RX ORDER — MAGNESIUM HYDROXIDE/ALUMINUM HYDROXICE/SIMETHICONE 120; 1200; 1200 MG/30ML; MG/30ML; MG/30ML
30 SUSPENSION ORAL ONCE
Status: COMPLETED | OUTPATIENT
Start: 2024-05-30 | End: 2024-05-30

## 2024-05-30 RX ORDER — POTASSIUM CHLORIDE 750 MG/1
10 TABLET, EXTENDED RELEASE ORAL 2 TIMES DAILY
Qty: 20 TABLET | Refills: 0 | Status: SHIPPED | OUTPATIENT
Start: 2024-05-30

## 2024-05-30 RX ORDER — LIDOCAINE HYDROCHLORIDE 20 MG/ML
15 SOLUTION OROPHARYNGEAL ONCE
Status: COMPLETED | OUTPATIENT
Start: 2024-05-30 | End: 2024-05-30

## 2024-05-30 RX ADMIN — LIDOCAINE HYDROCHLORIDE 15 ML: 20 SOLUTION ORAL at 20:54

## 2024-05-30 RX ADMIN — ALUMINUM HYDROXIDE, MAGNESIUM HYDROXIDE, AND DIMETHICONE 30 ML: 200; 20; 200 SUSPENSION ORAL at 20:54

## 2024-05-31 NOTE — ED PROVIDER NOTES
"History  Chief Complaint   Patient presents with    Cough     Pt has esophagus cancer, states he hasn't been able to catch his breath and has a \"nagging cough for four years\".      Patient is a 68-year-old male presenting to the emergency department complaining of persistent nonproductive cough with tickle in his throat leading to coughing fits at times and feeling short of breath as well, he reports a history of esophageal cancer, he denies any fevers, no chest pain, no nausea, vomiting or diarrhea, no sick contacts        Prior to Admission Medications   Prescriptions Last Dose Informant Patient Reported? Taking?   Omega-3 Fatty Acids (fish oil) 1,000 mg   Yes No   Sig: Take by mouth   albuterol (PROVENTIL HFA,VENTOLIN HFA) 90 mcg/act inhaler   No No   Si-3 inhalations every 3-4 hours for 3 days and then as needed for cough, wheezing   aspirin (ECOTRIN LOW STRENGTH) 81 mg EC tablet   Yes No   Sig: Take by mouth   benzonatate (TESSALON PERLES) 100 mg capsule   No No   Sig: Take 1 capsule (100 mg total) by mouth daily at bedtime as needed for cough   docusate sodium (COLACE) 100 mg capsule   Yes No   Sig: Take by mouth   famotidine (PEPCID) 20 mg tablet   Yes No   Sig: Take 20 mg by mouth 2 (two) times a day   fluticasone (FLONASE) 50 mcg/act nasal spray   Yes No   Si sprays by Each Nare route daily   guaiFENesin (MUCINEX) 600 mg 12 hr tablet   No No   Sig: Take 2 tablets (1,200 mg total) by mouth every 12 (twelve) hours   hydrochlorothiazide (MICROZIDE) 12.5 mg capsule   Yes No   Sig: Take 1 capsule by mouth daily   montelukast (SINGULAIR) 10 mg tablet   Yes No   Sig: Take 10 mg by mouth daily   multivitamin (THERAGRAN) TABS   Yes No   Sig: Take 1 tablet by mouth daily   pantoprazole (PROTONIX) 40 mg tablet   Yes No   Sig: Take by mouth   simvastatin (ZOCOR) 40 mg tablet   Yes No   Sig: Take 1 tablet by mouth daily   sucralfate (CARAFATE) 1 g tablet   No No   Sig: Take 1 tablet (1 g total) by mouth 3 " (three) times a day for 14 days   topiramate (TOPAMAX) 50 MG tablet   Yes No   Sig: Take 50 mg by mouth 2 (two) times a day   ziprasidone (GEODON) 80 mg capsule   Yes No   Sig: Take 1 capsule by mouth daily at bedtime      Facility-Administered Medications: None       Past Medical History:   Diagnosis Date    Anxiety     GERD (gastroesophageal reflux disease)     Hyperlipemia     Hypertension     Psychiatric disorder        Past Surgical History:   Procedure Laterality Date    CHOLECYSTECTOMY      HERNIA REPAIR         History reviewed. No pertinent family history.  I have reviewed and agree with the history as documented.    E-Cigarette/Vaping    E-Cigarette Use Never User      E-Cigarette/Vaping Substances    Nicotine No     THC No     CBD No     Flavoring No      Social History     Tobacco Use    Smoking status: Former     Types: Cigarettes     Passive exposure: Never    Smokeless tobacco: Never   Vaping Use    Vaping status: Never Used   Substance Use Topics    Alcohol use: Yes     Comment: rare    Drug use: Not Currently       Review of Systems   Constitutional: Negative.    HENT: Negative.     Eyes: Negative.    Respiratory:  Positive for cough.    Cardiovascular: Negative.    Gastrointestinal: Negative.    Endocrine: Negative.    Genitourinary: Negative.    Musculoskeletal: Negative.    Skin: Negative.    Allergic/Immunologic: Negative.    Neurological: Negative.    Hematological: Negative.    Psychiatric/Behavioral: Negative.         Physical Exam  Physical Exam  Constitutional:       Appearance: He is well-developed.   HENT:      Head: Normocephalic and atraumatic.      Nose: Nose normal.      Mouth/Throat:      Mouth: Mucous membranes are moist.   Eyes:      Conjunctiva/sclera: Conjunctivae normal.      Pupils: Pupils are equal, round, and reactive to light.   Cardiovascular:      Rate and Rhythm: Normal rate and regular rhythm.      Heart sounds: Normal heart sounds.   Pulmonary:      Effort: Pulmonary  effort is normal.      Breath sounds: Normal breath sounds.   Abdominal:      Palpations: Abdomen is soft.   Musculoskeletal:         General: Normal range of motion.      Cervical back: Normal range of motion and neck supple.   Skin:     General: Skin is warm and dry.   Neurological:      Mental Status: He is alert and oriented to person, place, and time.         Vital Signs  ED Triage Vitals [05/30/24 2037]   Temperature Pulse Respirations Blood Pressure SpO2   97.9 °F (36.6 °C) 84 22 (!) 173/86 100 %      Temp Source Heart Rate Source Patient Position - Orthostatic VS BP Location FiO2 (%)   Temporal Monitor Sitting Right arm --      Pain Score       No Pain           Vitals:    05/30/24 2037 05/30/24 2100 05/30/24 2130   BP: (!) 173/86 143/70 130/71   Pulse: 84 82 77   Patient Position - Orthostatic VS: Sitting Sitting Sitting         Visual Acuity      ED Medications  Medications   aluminum-magnesium hydroxide-simethicone (MAALOX) oral suspension 30 mL (30 mL Oral Given 5/30/24 2054)   Lidocaine Viscous HCl (XYLOCAINE) 2 % mucosal solution 15 mL (15 mL Swish & Spit Given 5/30/24 2054)       Diagnostic Studies  Results Reviewed       Procedure Component Value Units Date/Time    B-Type Natriuretic Peptide(BNP) [[id number]]  (Normal) Collected: 05/30/24 2050    Lab Status: Final result Specimen: Blood from Arm, Left Updated: 05/30/24 2120     BNP 70 pg/mL     Comprehensive metabolic panel [[id number]]  (Abnormal) Collected: 05/30/24 2050    Lab Status: Final result Specimen: Blood from Arm, Left Updated: 05/30/24 2116     Sodium 135 mmol/L      Potassium 3.2 mmol/L      Chloride 102 mmol/L      CO2 24 mmol/L      ANION GAP 9 mmol/L      BUN 24 mg/dL      Creatinine 0.93 mg/dL      Glucose 101 mg/dL      Calcium 8.8 mg/dL      AST 29 U/L      ALT 29 U/L      Alkaline Phosphatase 49 U/L      Total Protein 6.8 g/dL      Albumin 4.3 g/dL      Total Bilirubin 0.35 mg/dL      eGFR 84 ml/min/1.73sq m     Narrative:       [employer] guidelines for Chronic Kidney Disease (CKD):     Stage 1 with normal or high GFR (GFR > 90 mL/min/1.73 square meters)    Stage 2 Mild CKD (GFR = 60-89 mL/min/1.73 square meters)    Stage 3A Moderate CKD (GFR = 45-59 mL/min/1.73 square meters)    Stage 3B Moderate CKD (GFR = 30-44 mL/min/1.73 square meters)    Stage 4 Severe CKD (GFR = 15-29 mL/min/1.73 square meters)    Stage 5 End Stage CKD (GFR <15 mL/min/1.73 square meters)  Note: GFR calculation is accurate only with a steady state creatinine    Magnesium [[id number]]  (Normal) Collected: 05/30/24 2050    Lab Status: Final result Specimen: Blood from Arm, Left Updated: 05/30/24 2116     Magnesium 2.1 mg/dL     CBC and differential [[id number]]  (Abnormal) Collected: 05/30/24 2050    Lab Status: Final result Specimen: Blood from Arm, Left Updated: 05/30/24 2055     WBC 7.71 Thousand/uL      RBC 4.62 Million/uL      Hemoglobin 13.5 g/dL      Hematocrit 39.6 %      MCV 86 fL      MCH 29.2 pg      MCHC 34.1 g/dL      RDW 13.2 %      MPV 8.7 fL      Platelets 209 Thousands/uL      nRBC 0 /100 WBCs      Segmented % 58 %      Immature Grans % 0 %      Lymphocytes % 29 %      Monocytes % 9 %      Eosinophils Relative 3 %      Basophils Relative 1 %      Absolute Neutrophils 4.40 Thousands/µL      Absolute Immature Grans 0.03 Thousand/uL      Absolute Lymphocytes 2.27 Thousands/µL      Absolute Monocytes 0.69 Thousand/µL      Eosinophils Absolute 0.26 Thousand/µL      Basophils Absolute 0.06 Thousands/µL                    XR chest 2 views   ED Interpretation by Bernadette Reynoso DO (05/30 2207)   No acute findings                 Procedures  Procedures         ED Course  ED Course as of 05/30/24 2207   Thu May 30, 2024   2207 B-Type Natriuretic Peptide(BNP)   2207 Magnesium   2207 CBC and differential(!)   2207 Comprehensive metabolic panel(!)   2207 XR chest 2 views                                             Medical Decision Making  This  patient presents with symptoms suspicious for likely viral upper respiratory infection.  Based on history and physical doubt sinusitis. Do not suspect any underlying cardiopulmonary process.  Chest x-ray shows no evidence of pneumonia.  Patient is nontoxic, in no acute distress and not in need of any emergent medical intervention.  Patient and/or parents told to continue good supportive care at home, follow-up with PCP as needed or return if symptoms worsen      Problems Addressed:  Acute cough: acute illness or injury  Hypokalemia: acute illness or injury    Amount and/or Complexity of Data Reviewed  Labs: ordered. Decision-making details documented in ED Course.  Radiology: ordered. Decision-making details documented in ED Course.    Risk  OTC drugs.  Prescription drug management.             Disposition  Final diagnoses:   Acute cough   Hypokalemia     Time reflects when diagnosis was documented in both MDM as applicable and the Disposition within this note       Time User Action Codes Description Comment    5/30/2024  9:57 PM Bernadette Reynoso [R05.1] Acute cough     5/30/2024  9:58 PM Bernadette Reynoso [E87.6] Hypokalemia           ED Disposition       ED Disposition   Discharge    Condition   Stable    Date/Time   Thu May 30, 2024  9:57 PM    Comment   Robin Lucia discharge to home/self care.                   Follow-up Information       Follow up With Specialties Details Why Contact Info    Manoj Mcallister MD Family Medicine  As needed [address] [facility] [id number] [phone]              Discharge Medication List as of 5/30/2024  9:58 PM        START taking these medications    Details   potassium chloride (Klor-Con M10) 10 mEq tablet Take 1 tablet (10 mEq total) by mouth 2 (two) times a day, Starting Thu 5/30/2024, Normal           CONTINUE these medications which have NOT CHANGED    Details   albuterol (PROVENTIL HFA,VENTOLIN HFA) 90 mcg/act inhaler 2-3 inhalations every 3-4 hours for  3 days and then as needed for cough, wheezing, Normal      aspirin (ECOTRIN LOW STRENGTH) 81 mg EC tablet Take by mouth, Historical Med      benzonatate (TESSALON PERLES) 100 mg capsule Take 1 capsule (100 mg total) by mouth daily at bedtime as needed for cough, Starting Wed 12/6/2023, Normal      docusate sodium (COLACE) 100 mg capsule Take by mouth, Historical Med      famotidine (PEPCID) 20 mg tablet Take 20 mg by mouth 2 (two) times a day, Starting Tue 8/2/2022, Historical Med      fluticasone (FLONASE) 50 mcg/act nasal spray 2 sprays by Each Nare route daily, Starting Wed 7/27/2022, Historical Med      guaiFENesin (MUCINEX) 600 mg 12 hr tablet Take 2 tablets (1,200 mg total) by mouth every 12 (twelve) hours, Starting Wed 12/6/2023, Normal      hydrochlorothiazide (MICROZIDE) 12.5 mg capsule Take 1 capsule by mouth daily, Starting Sun 6/5/2022, Historical Med      montelukast (SINGULAIR) 10 mg tablet Take 10 mg by mouth daily, Starting Tue 9/26/2023, Historical Med      multivitamin (THERAGRAN) TABS Take 1 tablet by mouth daily, Historical Med      Omega-3 Fatty Acids (fish oil) 1,000 mg Take by mouth, Historical Med      pantoprazole (PROTONIX) 40 mg tablet Take by mouth, Historical Med      simvastatin (ZOCOR) 40 mg tablet Take 1 tablet by mouth daily, Starting Sun 6/5/2022, Historical Med      sucralfate (CARAFATE) 1 g tablet Take 1 tablet (1 g total) by mouth 3 (three) times a day for 14 days, Starting Mon 11/6/2023, Until Mon 11/20/2023, Normal      topiramate (TOPAMAX) 50 MG tablet Take 50 mg by mouth 2 (two) times a day, Historical Med      ziprasidone (GEODON) 80 mg capsule Take 1 capsule by mouth daily at bedtime, Starting Mon 5/9/2022, Historical Med             No discharge procedures on file.    PDMP Review       None            ED Provider  Electronically Signed by             Bernadette Reynoso DO  05/30/24 0208

## 2024-07-14 ENCOUNTER — HOSPITAL ENCOUNTER (EMERGENCY)
Facility: HOSPITAL | Age: 69
Discharge: HOME | End: 2024-07-14
Payer: COMMERCIAL

## 2024-07-14 ENCOUNTER — APPOINTMENT (EMERGENCY)
Dept: RADIOLOGY | Facility: HOSPITAL | Age: 69
End: 2024-07-14
Payer: COMMERCIAL

## 2024-07-14 VITALS
OXYGEN SATURATION: 98 % | HEART RATE: 87 BPM | WEIGHT: 158.29 LBS | BODY MASS INDEX: 27.17 KG/M2 | RESPIRATION RATE: 20 BRPM | TEMPERATURE: 97.3 F | DIASTOLIC BLOOD PRESSURE: 60 MMHG | SYSTOLIC BLOOD PRESSURE: 127 MMHG

## 2024-07-14 DIAGNOSIS — M79.632 LEFT FOREARM PAIN: Primary | ICD-10-CM

## 2024-07-14 LAB
2HR DELTA HS TROPONIN: >1 NG/L
ALBUMIN SERPL BCG-MCNC: 4.7 G/DL (ref 3.5–5)
ALP SERPL-CCNC: 60 U/L (ref 34–104)
ALT SERPL W P-5'-P-CCNC: 35 U/L (ref 7–52)
ANION GAP SERPL CALCULATED.3IONS-SCNC: 9 MMOL/L (ref 4–13)
AST SERPL W P-5'-P-CCNC: 30 U/L (ref 13–39)
BASOPHILS # BLD AUTO: 0.06 THOUSANDS/ÂΜL (ref 0–0.1)
BASOPHILS NFR BLD AUTO: 1 % (ref 0–1)
BILIRUB SERPL-MCNC: 0.29 MG/DL (ref 0.2–1)
BUN SERPL-MCNC: 12 MG/DL (ref 5–25)
CALCIUM SERPL-MCNC: 9.5 MG/DL (ref 8.4–10.2)
CARDIAC TROPONIN I PNL SERPL HS: 3 NG/L
CARDIAC TROPONIN I PNL SERPL HS: <2 NG/L
CHLORIDE SERPL-SCNC: 98 MMOL/L (ref 96–108)
CO2 SERPL-SCNC: 28 MMOL/L (ref 21–32)
CREAT SERPL-MCNC: 0.95 MG/DL (ref 0.6–1.3)
EOSINOPHIL # BLD AUTO: 0.19 THOUSAND/ÂΜL (ref 0–0.61)
EOSINOPHIL NFR BLD AUTO: 3 % (ref 0–6)
ERYTHROCYTE [DISTWIDTH] IN BLOOD BY AUTOMATED COUNT: 12.9 % (ref 11.6–15.1)
GFR SERPL CREATININE-BSD FRML MDRD: 81 ML/MIN/1.73SQ M
GLUCOSE SERPL-MCNC: 95 MG/DL (ref 65–140)
HCT VFR BLD AUTO: 43.8 % (ref 36.5–49.3)
HGB BLD-MCNC: 15 G/DL (ref 12–17)
IMM GRANULOCYTES # BLD AUTO: 0.01 THOUSAND/UL (ref 0–0.2)
IMM GRANULOCYTES NFR BLD AUTO: 0 % (ref 0–2)
LYMPHOCYTES # BLD AUTO: 2.32 THOUSANDS/ÂΜL (ref 0.6–4.47)
LYMPHOCYTES NFR BLD AUTO: 36 % (ref 14–44)
MAGNESIUM SERPL-MCNC: 2.3 MG/DL (ref 1.9–2.7)
MCH RBC QN AUTO: 29.1 PG (ref 26.8–34.3)
MCHC RBC AUTO-ENTMCNC: 34.2 G/DL (ref 31.4–37.4)
MCV RBC AUTO: 85 FL (ref 82–98)
MONOCYTES # BLD AUTO: 0.54 THOUSAND/ÂΜL (ref 0.17–1.22)
MONOCYTES NFR BLD AUTO: 8 % (ref 4–12)
NEUTROPHILS # BLD AUTO: 3.42 THOUSANDS/ÂΜL (ref 1.85–7.62)
NEUTS SEG NFR BLD AUTO: 52 % (ref 43–75)
NRBC BLD AUTO-RTO: 0 /100 WBCS
PLATELET # BLD AUTO: 222 THOUSANDS/UL (ref 149–390)
PMV BLD AUTO: 9.1 FL (ref 8.9–12.7)
POTASSIUM SERPL-SCNC: 3.2 MMOL/L (ref 3.5–5.3)
PROT SERPL-MCNC: 7.7 G/DL (ref 6.4–8.4)
RBC # BLD AUTO: 5.15 MILLION/UL (ref 3.88–5.62)
SODIUM SERPL-SCNC: 135 MMOL/L (ref 135–147)
WBC # BLD AUTO: 6.54 THOUSAND/UL (ref 4.31–10.16)

## 2024-07-14 PROCEDURE — 71045 X-RAY EXAM CHEST 1 VIEW: CPT

## 2024-07-14 PROCEDURE — 36415 COLL VENOUS BLD VENIPUNCTURE: CPT | Performed by: PHYSICIAN ASSISTANT

## 2024-07-14 PROCEDURE — 84484 ASSAY OF TROPONIN QUANT: CPT | Performed by: PHYSICIAN ASSISTANT

## 2024-07-14 PROCEDURE — 99284 EMERGENCY DEPT VISIT MOD MDM: CPT

## 2024-07-14 PROCEDURE — 85025 COMPLETE CBC W/AUTO DIFF WBC: CPT | Performed by: PHYSICIAN ASSISTANT

## 2024-07-14 PROCEDURE — 83735 ASSAY OF MAGNESIUM: CPT | Performed by: PHYSICIAN ASSISTANT

## 2024-07-14 PROCEDURE — 93005 ELECTROCARDIOGRAM TRACING: CPT

## 2024-07-14 PROCEDURE — 80053 COMPREHEN METABOLIC PANEL: CPT | Performed by: PHYSICIAN ASSISTANT

## 2024-07-14 PROCEDURE — 99284 EMERGENCY DEPT VISIT MOD MDM: CPT | Performed by: PHYSICIAN ASSISTANT

## 2024-07-14 NOTE — ED PROVIDER NOTES
History  Chief Complaint   Patient presents with    Arm Pain     Patient brought in by EMS for left arm pain and acid reflux.      Patient is a 68-year-old male non-smoker who presents with concern of left arm pain.  Patient states that while laying down he started to feel left forearm pain and this lasted for approximately 20 minutes resolved spontaneously.  States that he came by EMS due to concerns of family history of CAD.  Patient does not have any personal history of CAD.  No falls or traumas no shortness of breath or chest pain currently.  Did not take anything for his discomfort it resolved spontaneously.          Prior to Admission Medications   Prescriptions Last Dose Informant Patient Reported? Taking?   Omega-3 Fatty Acids (fish oil) 1,000 mg   Yes No   Sig: Take by mouth   albuterol (PROVENTIL HFA,VENTOLIN HFA) 90 mcg/act inhaler   No No   Si-3 inhalations every 3-4 hours for 3 days and then as needed for cough, wheezing   aspirin (ECOTRIN LOW STRENGTH) 81 mg EC tablet   Yes No   Sig: Take by mouth   benzonatate (TESSALON PERLES) 100 mg capsule   No No   Sig: Take 1 capsule (100 mg total) by mouth daily at bedtime as needed for cough   docusate sodium (COLACE) 100 mg capsule   Yes No   Sig: Take by mouth   famotidine (PEPCID) 20 mg tablet   Yes No   Sig: Take 20 mg by mouth 2 (two) times a day   fluticasone (FLONASE) 50 mcg/act nasal spray   Yes No   Si sprays by Each Nare route daily   guaiFENesin (MUCINEX) 600 mg 12 hr tablet   No No   Sig: Take 2 tablets (1,200 mg total) by mouth every 12 (twelve) hours   hydrochlorothiazide (MICROZIDE) 12.5 mg capsule   Yes No   Sig: Take 1 capsule by mouth daily   montelukast (SINGULAIR) 10 mg tablet   Yes No   Sig: Take 10 mg by mouth daily   multivitamin (THERAGRAN) TABS   Yes No   Sig: Take 1 tablet by mouth daily   pantoprazole (PROTONIX) 40 mg tablet   Yes No   Sig: Take by mouth   potassium chloride (Klor-Con M10) 10 mEq tablet   No No   Sig: Take 1  tablet (10 mEq total) by mouth 2 (two) times a day   simvastatin (ZOCOR) 40 mg tablet   Yes No   Sig: Take 1 tablet by mouth daily   sucralfate (CARAFATE) 1 g tablet   No No   Sig: Take 1 tablet (1 g total) by mouth 3 (three) times a day for 14 days   topiramate (TOPAMAX) 50 MG tablet   Yes No   Sig: Take 50 mg by mouth 2 (two) times a day   ziprasidone (GEODON) 80 mg capsule   Yes No   Sig: Take 1 capsule by mouth daily at bedtime      Facility-Administered Medications: None       Past Medical History:   Diagnosis Date    Anxiety     GERD (gastroesophageal reflux disease)     Hyperlipemia     Hypertension     Psychiatric disorder        Past Surgical History:   Procedure Laterality Date    CHOLECYSTECTOMY      HERNIA REPAIR         History reviewed. No pertinent family history.  I have reviewed and agree with the history as documented.    E-Cigarette/Vaping    E-Cigarette Use Never User      E-Cigarette/Vaping Substances    Nicotine No     THC No     CBD No     Flavoring No      Social History     Tobacco Use    Smoking status: Former     Types: Cigarettes     Passive exposure: Never    Smokeless tobacco: Never   Vaping Use    Vaping status: Never Used   Substance Use Topics    Alcohol use: Yes     Comment: rare    Drug use: Not Currently       Review of Systems   All other systems reviewed and are negative.      Physical Exam  Physical Exam  Vitals and nursing note reviewed.   Constitutional:       General: He is not in acute distress.     Appearance: He is well-developed.   HENT:      Head: Normocephalic and atraumatic.      Mouth/Throat:      Mouth: Oropharynx is clear and moist. Mucous membranes are dry.   Eyes:      Extraocular Movements: Extraocular movements intact and EOM normal.      Pupils: Pupils are equal, round, and reactive to light.   Cardiovascular:      Rate and Rhythm: Normal rate and regular rhythm.      Heart sounds: No murmur heard.  Pulmonary:      Effort: Pulmonary effort is normal. No  respiratory distress.      Breath sounds: Normal breath sounds.   Abdominal:      General: Bowel sounds are normal.      Palpations: Abdomen is soft.      Tenderness: There is no abdominal tenderness.   Musculoskeletal:      Cervical back: Normal range of motion.   Skin:     General: Skin is warm and dry.      Capillary Refill: Capillary refill takes less than 2 seconds.   Neurological:      General: No focal deficit present.      Mental Status: He is alert and oriented to person, place, and time.   Psychiatric:         Mood and Affect: Mood and affect normal.         Behavior: Behavior normal.         Vital Signs  ED Triage Vitals [07/14/24 1530]   Temperature Pulse Respirations Blood Pressure SpO2   (!) 97.3 °F (36.3 °C) 84 18 155/89 97 %      Temp Source Heart Rate Source Patient Position - Orthostatic VS BP Location FiO2 (%)   Temporal Monitor Lying Right arm --      Pain Score       --           Vitals:    07/14/24 1530 07/14/24 1700 07/14/24 1730   BP: 155/89 154/89 127/60   Pulse: 84 79 87   Patient Position - Orthostatic VS: Lying Sitting Sitting         Visual Acuity      ED Medications  Medications - No data to display    Diagnostic Studies  Results Reviewed       Procedure Component Value Units Date/Time    HS Troponin I 2hr [[id number]]  (Normal) Collected: 07/14/24 1736    Lab Status: Final result Specimen: Blood from Arm, Left Updated: 07/14/24 1803     hs TnI 2hr 3 ng/L      Delta 2hr hsTnI >1 ng/L     HS Troponin 0hr (reflex protocol) [[id number]]  (Normal) Collected: 07/14/24 1545    Lab Status: Final result Specimen: Blood from Arm, Left Updated: 07/14/24 1615     hs TnI 0hr <2 ng/L     Comprehensive metabolic panel [[id number]]  (Abnormal) Collected: 07/14/24 1545    Lab Status: Final result Specimen: Blood from Arm, Left Updated: 07/14/24 1606     Sodium 135 mmol/L      Potassium 3.2 mmol/L      Chloride 98 mmol/L      CO2 28 mmol/L      ANION GAP 9 mmol/L      BUN 12 mg/dL      Creatinine 0.95  mg/dL      Glucose 95 mg/dL      Calcium 9.5 mg/dL      AST 30 U/L      ALT 35 U/L      Alkaline Phosphatase 60 U/L      Total Protein 7.7 g/dL      Albumin 4.7 g/dL      Total Bilirubin 0.29 mg/dL      eGFR 81 ml/min/1.73sq m     Narrative:      [employer] guidelines for Chronic Kidney Disease (CKD):     Stage 1 with normal or high GFR (GFR > 90 mL/min/1.73 square meters)    Stage 2 Mild CKD (GFR = 60-89 mL/min/1.73 square meters)    Stage 3A Moderate CKD (GFR = 45-59 mL/min/1.73 square meters)    Stage 3B Moderate CKD (GFR = 30-44 mL/min/1.73 square meters)    Stage 4 Severe CKD (GFR = 15-29 mL/min/1.73 square meters)    Stage 5 End Stage CKD (GFR <15 mL/min/1.73 square meters)  Note: GFR calculation is accurate only with a steady state creatinine    Magnesium [[id number]]  (Normal) Collected: 07/14/24 1545    Lab Status: Final result Specimen: Blood from Arm, Left Updated: 07/14/24 1606     Magnesium 2.3 mg/dL     CBC and differential [[id number]] Collected: 07/14/24 1545    Lab Status: Final result Specimen: Blood from Arm, Left Updated: 07/14/24 1552     WBC 6.54 Thousand/uL      RBC 5.15 Million/uL      Hemoglobin 15.0 g/dL      Hematocrit 43.8 %      MCV 85 fL      MCH 29.1 pg      MCHC 34.2 g/dL      RDW 12.9 %      MPV 9.1 fL      Platelets 222 Thousands/uL      nRBC 0 /100 WBCs      Segmented % 52 %      Immature Grans % 0 %      Lymphocytes % 36 %      Monocytes % 8 %      Eosinophils Relative 3 %      Basophils Relative 1 %      Absolute Neutrophils 3.42 Thousands/µL      Absolute Immature Grans 0.01 Thousand/uL      Absolute Lymphocytes 2.32 Thousands/µL      Absolute Monocytes 0.54 Thousand/µL      Eosinophils Absolute 0.19 Thousand/µL      Basophils Absolute 0.06 Thousands/µL                    XR chest 1 view portable    (Results Pending)              Procedures  Procedures         ED Course               HEART Risk Score      Flowsheet Row Most Recent Value   Heart Score Risk  Calculator    History 0 Filed at: 07/14/2024 1940   ECG 0 Filed at: 07/14/2024 1940   Age 2 Filed at: 07/14/2024 1940   Risk Factors 1 Filed at: 07/14/2024 1940   Troponin 0 Filed at: 07/14/2024 1940   HEART Score 3 Filed at: 07/14/2024 1940                          SBIRT 20yo+      Flowsheet Row Most Recent Value   Initial Alcohol Screen: US AUDIT-C     1. How often do you have a drink containing alcohol? 1 Filed at: 07/14/2024 1557   2. How many drinks containing alcohol do you have on a typical day you are drinking?  0 Filed at: 07/14/2024 1557   3a. Male UNDER 65: How often do you have five or more drinks on one occasion? 0 Filed at: 07/14/2024 1557   3b. FEMALE Any Age, or MALE 65+: How often do you have 4 or more drinks on one occassion? 0 Filed at: 07/14/2024 1557   Audit-C Score 1 Filed at: 07/14/2024 1557   SERGIO: How many times in the past year have you...    Used an illegal drug or used a prescription medication for non-medical reasons? Never Filed at: 07/14/2024 1557                      Medical Decision Making  Patient is a 68-year-old male non-smoker who presents with concern of left arm pain.  Patient states that while laying down he started to feel left forearm pain and this lasted for approximately 20 minutes resolved spontaneously.  States that he came by EMS due to concerns of family history of CAD.  Patient does not have any personal history of CAD.  No falls or traumas no shortness of breath or chest pain currently.  Did not take anything for his discomfort it resolved spontaneously.    The patient's EKG was without any acute ischemic findings.  Patient had heart score performed troponin x 2 unremarkable patient discharged home no recurrent pain.  Most likely musculoskeletal.    Amount and/or Complexity of Data Reviewed  Labs: ordered. Decision-making details documented in ED Course.  Radiology: ordered and independent interpretation performed. Decision-making details documented in ED  Course.                 Disposition  Final diagnoses:   Left forearm pain     Time reflects when diagnosis was documented in both MDM as applicable and the Disposition within this note       Time User Action Codes Description Comment    7/14/2024  6:04 PM Ken Brian Add [M79.602] Left arm pain     7/14/2024  6:04 PM Ken Brian Remove [M79.602] Left arm pain     7/14/2024  6:04 PM Ken Brian Add [M79.632] Left forearm pain           ED Disposition       ED Disposition   Discharge    Condition   Stable    Date/Time   Sun Jul 14, 2024 1804    Comment   Robin Rea discharge to home/self care.                   Follow-up Information    None         Discharge Medication List as of 7/14/2024  6:04 PM        CONTINUE these medications which have NOT CHANGED    Details   albuterol (PROVENTIL HFA,VENTOLIN HFA) 90 mcg/act inhaler 2-3 inhalations every 3-4 hours for 3 days and then as needed for cough, wheezing, Normal      aspirin (ECOTRIN LOW STRENGTH) 81 mg EC tablet Take by mouth, Historical Med      benzonatate (TESSALON PERLES) 100 mg capsule Take 1 capsule (100 mg total) by mouth daily at bedtime as needed for cough, Starting Wed 12/6/2023, Normal      docusate sodium (COLACE) 100 mg capsule Take by mouth, Historical Med      famotidine (PEPCID) 20 mg tablet Take 20 mg by mouth 2 (two) times a day, Starting Tue 8/2/2022, Historical Med      fluticasone (FLONASE) 50 mcg/act nasal spray 2 sprays by Each Nare route daily, Starting Wed 7/27/2022, Historical Med      guaiFENesin (MUCINEX) 600 mg 12 hr tablet Take 2 tablets (1,200 mg total) by mouth every 12 (twelve) hours, Starting Wed 12/6/2023, Normal      hydrochlorothiazide (MICROZIDE) 12.5 mg capsule Take 1 capsule by mouth daily, Starting Sun 6/5/2022, Historical Med      montelukast (SINGULAIR) 10 mg tablet Take 10 mg by mouth daily, Starting Tue 9/26/2023, Historical Med      multivitamin (THERAGRAN) TABS Take 1 tablet by mouth daily, Historical Med       Omega-3 Fatty Acids (fish oil) 1,000 mg Take by mouth, Historical Med      pantoprazole (PROTONIX) 40 mg tablet Take by mouth, Historical Med      potassium chloride (Klor-Con M10) 10 mEq tablet Take 1 tablet (10 mEq total) by mouth 2 (two) times a day, Starting u 5/30/2024, Normal      simvastatin (ZOCOR) 40 mg tablet Take 1 tablet by mouth daily, Starting Sun 6/5/2022, Historical Med      sucralfate (CARAFATE) 1 g tablet Take 1 tablet (1 g total) by mouth 3 (three) times a day for 14 days, Starting Mon 11/6/2023, Until Mon 11/20/2023, Normal      topiramate (TOPAMAX) 50 MG tablet Take 50 mg by mouth 2 (two) times a day, Historical Med      ziprasidone (GEODON) 80 mg capsule Take 1 capsule by mouth daily at bedtime, Starting Mon 5/9/2022, Historical Med             No discharge procedures on file.    PDMP Review       None            ED Provider  Electronically Signed by             Brian Rogers PA-C  07/14/24 1940

## 2024-07-15 LAB
ATRIAL RATE: 76 BPM
P AXIS: 49 DEGREES
PR INTERVAL: 150 MS
QRS AXIS: 9 DEGREES
QRSD INTERVAL: 76 MS
QT INTERVAL: 398 MS
QTC INTERVAL: 447 MS
T WAVE AXIS: 15 DEGREES
VENTRICULAR RATE: 76 BPM

## 2024-07-15 PROCEDURE — 93010 ELECTROCARDIOGRAM REPORT: CPT | Performed by: INTERNAL MEDICINE

## 2024-07-15 NOTE — ED ATTENDING ATTESTATION
7/14/2024  I, Donita Kidd DO, discussed the patient with the resident/non-physician practitioner and agree with the resident's/non-physician practitioner's findings, Plan of Care, and MDM as documented in the resident's/non-physician practitioner's note, except where noted. All available labs and Radiology studies were reviewed.  I was present for key portions of any procedure(s) performed by the resident/non-physician practitioner and I was immediately available to provide assistance.       At this point I agree with the current assessment done in the Emergency Department.           Critical Care Time  Procedures

## 2025-06-12 DIAGNOSIS — R91.8 OTHER NONSPECIFIC ABNORMAL FINDING OF LUNG FIELD: ICD-10-CM

## 2025-06-12 DIAGNOSIS — Z72.0 TOBACCO USE: ICD-10-CM

## 2025-06-17 ENCOUNTER — HOSPITAL ENCOUNTER (OUTPATIENT)
Dept: CT IMAGING | Facility: HOSPITAL | Age: 70
Discharge: HOME | End: 2025-06-17
Attending: INTERNAL MEDICINE
Payer: COMMERCIAL

## 2025-06-17 DIAGNOSIS — R91.8 OTHER NONSPECIFIC ABNORMAL FINDING OF LUNG FIELD: ICD-10-CM

## 2025-06-17 DIAGNOSIS — Z72.0 TOBACCO USE: ICD-10-CM

## 2025-06-17 PROCEDURE — 71250 CT THORAX DX C-: CPT
